# Patient Record
Sex: FEMALE | Race: WHITE | HISPANIC OR LATINO | URBAN - METROPOLITAN AREA
[De-identification: names, ages, dates, MRNs, and addresses within clinical notes are randomized per-mention and may not be internally consistent; named-entity substitution may affect disease eponyms.]

---

## 2019-09-26 ENCOUNTER — OUTPATIENT (OUTPATIENT)
Dept: OUTPATIENT SERVICES | Facility: HOSPITAL | Age: 49
LOS: 1 days | Discharge: HOME | End: 2019-09-26

## 2019-09-26 DIAGNOSIS — F11.20 OPIOID DEPENDENCE, UNCOMPLICATED: ICD-10-CM

## 2019-09-26 DIAGNOSIS — E13.9 OTHER SPECIFIED DIABETES MELLITUS WITHOUT COMPLICATIONS: ICD-10-CM

## 2019-09-26 DIAGNOSIS — D51.9 VITAMIN B12 DEFICIENCY ANEMIA, UNSPECIFIED: ICD-10-CM

## 2019-09-26 DIAGNOSIS — R94.6 ABNORMAL RESULTS OF THYROID FUNCTION STUDIES: ICD-10-CM

## 2019-09-26 DIAGNOSIS — F41.9 ANXIETY DISORDER, UNSPECIFIED: ICD-10-CM

## 2019-09-26 DIAGNOSIS — R53.82 CHRONIC FATIGUE, UNSPECIFIED: ICD-10-CM

## 2019-09-26 DIAGNOSIS — E78.5 HYPERLIPIDEMIA, UNSPECIFIED: ICD-10-CM

## 2019-09-26 DIAGNOSIS — Z23 ENCOUNTER FOR IMMUNIZATION: ICD-10-CM

## 2020-05-24 ENCOUNTER — TRANSCRIPTION ENCOUNTER (OUTPATIENT)
Age: 50
End: 2020-05-24

## 2021-02-16 PROBLEM — Z00.00 ENCOUNTER FOR PREVENTIVE HEALTH EXAMINATION: Status: ACTIVE | Noted: 2021-02-16

## 2021-04-19 ENCOUNTER — APPOINTMENT (OUTPATIENT)
Dept: UROGYNECOLOGY | Facility: CLINIC | Age: 51
End: 2021-04-19

## 2021-05-28 ENCOUNTER — APPOINTMENT (OUTPATIENT)
Dept: SURGERY | Facility: CLINIC | Age: 51
End: 2021-05-28
Payer: COMMERCIAL

## 2021-05-28 VITALS
DIASTOLIC BLOOD PRESSURE: 74 MMHG | HEIGHT: 61 IN | SYSTOLIC BLOOD PRESSURE: 126 MMHG | WEIGHT: 222 LBS | TEMPERATURE: 97.9 F | BODY MASS INDEX: 41.91 KG/M2

## 2021-05-28 DIAGNOSIS — Z78.9 OTHER SPECIFIED HEALTH STATUS: ICD-10-CM

## 2021-05-28 DIAGNOSIS — F17.200 NICOTINE DEPENDENCE, UNSPECIFIED, UNCOMPLICATED: ICD-10-CM

## 2021-05-28 DIAGNOSIS — Z82.3 FAMILY HISTORY OF STROKE: ICD-10-CM

## 2021-05-28 DIAGNOSIS — Z82.49 FAMILY HISTORY OF ISCHEMIC HEART DISEASE AND OTHER DISEASES OF THE CIRCULATORY SYSTEM: ICD-10-CM

## 2021-05-28 PROCEDURE — 99244 OFF/OP CNSLTJ NEW/EST MOD 40: CPT

## 2021-05-28 PROCEDURE — 99072 ADDL SUPL MATRL&STAF TM PHE: CPT

## 2021-06-02 NOTE — HISTORY OF PRESENT ILLNESS
[de-identified] : 49 year old female with a BMI of 41 presents today for Bariatric Consultation in consideration for the Laparoscopic Sleeve Gastrectomy. She has tried multiple weight loss modalities in the past such as portion control, weight watchers and Keto diet without any long term success. Patient states that she is concerned about her inability to lose weight on her own as she gets older. She has been obese for several years and is seeking surgery for improvement of hers overall health and and to mitigate the impact of future medical comorbidities. She has been a smoker for over 30 years and we discussed the risks of smoking and she is aware that she must quit for 2 months before we can proceed with surgery. She is currently walking 3-4 days/week for 20 minutes \par \par \par

## 2021-06-02 NOTE — CONSULT LETTER
[Courtesy Letter:] : I had the pleasure of seeing your patient, [unfilled], in my office today. [Please see my note below.] : Please see my note below. [Consult Closing:] : Thank you very much for allowing me to participate in the care of this patient.  If you have any questions, please do not hesitate to contact me. [Sincerely,] : Sincerely, [FreeTextEntry3] : Kari Fallon MD, FACS, FASMBS, Diplo ABOM\par Bariatric, Foregut & Minimally Invasive Surgery\par Associate Medical Director, Quality\par [facility] \par [facility]\par

## 2021-06-02 NOTE — PLAN
[FreeTextEntry1] : Plan: Information seminar.\par          Nutrition evaluation.\par          Preoperative evaluations.\par          Call with concerns.\par

## 2021-06-02 NOTE — REASON FOR VISIT
[Initial Consult] : an initial consult for [Morbid Obesity (BMI>40)] : morbid obesity (bmi>40) [FreeTextEntry2] : Patient presents for Bariatric Consultation

## 2021-06-02 NOTE — ASSESSMENT
[FreeTextEntry1] : JASON DONOHUE is a 50 year female seen today for Bariatric Consultation. The surgical options for weight loss have been extensively discussed with the patient and questions answered. The patient was provided written and verbal education regarding the Sleeve Gastrectomy. The patient appears to have a reasonable understanding of the process and is ready to proceed.  Risks, including but not limited to:  anesthesia, death, bleeding, infection, leaks, blood clots, ulcers, malnutrition and need for additional operations have been reviewed.  The importance of a consistent diet and exercise regimen in regards to weight loss and maintenance has also been discussed and the patient agrees to actively participate in the process.  The importance of lifelong mineral and vitamin supplementation was also reviewed with the patient. The need to adhere to an appropriate diet and exercise regimen were emphasized; in particular the need to perform moderate to intense physical activity most days of the week.  No promises have been made in regards to any given outcome and possibility of inadequate weight loss as well as regain has been discussed with the patient.  The patient understands that a long-term commitment is necessary for long-term success.\par

## 2021-06-02 NOTE — PHYSICAL EXAM
[Normal] : affect appropriate [de-identified] : Mallampati II [de-identified] : Soft, nondistended.

## 2021-06-02 NOTE — ADDENDUM
[FreeTextEntry1] : Patient seen, examined and reviewed with practitioner.  I agree with the assessment and plan; note amended as appropriate.  Patient is an appropriate candidate for WLS; will start preoperativeeducation and assessment process.\par

## 2021-06-23 ENCOUNTER — NON-APPOINTMENT (OUTPATIENT)
Age: 51
End: 2021-06-23

## 2021-07-01 ENCOUNTER — APPOINTMENT (OUTPATIENT)
Dept: UROGYNECOLOGY | Facility: CLINIC | Age: 51
End: 2021-07-01

## 2021-07-12 ENCOUNTER — NON-APPOINTMENT (OUTPATIENT)
Age: 51
End: 2021-07-12

## 2021-07-19 ENCOUNTER — APPOINTMENT (OUTPATIENT)
Dept: SURGERY | Facility: CLINIC | Age: 51
End: 2021-07-19
Payer: COMMERCIAL

## 2021-07-19 VITALS — HEIGHT: 61 IN | BODY MASS INDEX: 40.78 KG/M2 | WEIGHT: 216 LBS

## 2021-07-19 PROCEDURE — ZZZZZ: CPT

## 2021-07-20 ENCOUNTER — APPOINTMENT (OUTPATIENT)
Dept: CARDIOLOGY | Facility: CLINIC | Age: 51
End: 2021-07-20
Payer: COMMERCIAL

## 2021-07-20 VITALS — DIASTOLIC BLOOD PRESSURE: 80 MMHG | SYSTOLIC BLOOD PRESSURE: 120 MMHG | HEART RATE: 76 BPM | RESPIRATION RATE: 18 BRPM

## 2021-07-20 VITALS — HEIGHT: 61 IN | WEIGHT: 221 LBS | BODY MASS INDEX: 41.72 KG/M2

## 2021-07-20 DIAGNOSIS — Z78.9 OTHER SPECIFIED HEALTH STATUS: ICD-10-CM

## 2021-07-20 PROCEDURE — 99072 ADDL SUPL MATRL&STAF TM PHE: CPT

## 2021-07-20 PROCEDURE — 93000 ELECTROCARDIOGRAM COMPLETE: CPT | Mod: NC

## 2021-07-20 PROCEDURE — 99204 OFFICE O/P NEW MOD 45 MIN: CPT

## 2021-07-21 ENCOUNTER — TRANSCRIPTION ENCOUNTER (OUTPATIENT)
Age: 51
End: 2021-07-21

## 2021-07-27 ENCOUNTER — APPOINTMENT (OUTPATIENT)
Age: 51
End: 2021-07-27
Payer: COMMERCIAL

## 2021-07-27 VITALS
WEIGHT: 217 LBS | BODY MASS INDEX: 40.97 KG/M2 | SYSTOLIC BLOOD PRESSURE: 120 MMHG | RESPIRATION RATE: 12 BRPM | HEART RATE: 84 BPM | DIASTOLIC BLOOD PRESSURE: 80 MMHG | OXYGEN SATURATION: 95 % | HEIGHT: 61 IN

## 2021-07-27 PROCEDURE — 71046 X-RAY EXAM CHEST 2 VIEWS: CPT

## 2021-07-27 PROCEDURE — 94010 BREATHING CAPACITY TEST: CPT

## 2021-07-27 PROCEDURE — 99072 ADDL SUPL MATRL&STAF TM PHE: CPT

## 2021-07-27 PROCEDURE — 99203 OFFICE O/P NEW LOW 30 MIN: CPT | Mod: 25

## 2021-07-27 NOTE — HISTORY OF PRESENT ILLNESS
[Unrefreshing Sleep] : unrefreshing sleep [Sleepy When Sedentary] : sleepy when sedentary [Irritability] : irritability [Dry Throat] : dry throat [None] : The patient is not currently being treated for this problem [Obesity] : obesity

## 2021-07-27 NOTE — PHYSICAL EXAM
[No Acute Distress] : no acute distress [IV] : Mallampati Class: IV [Normal Appearance] : normal appearance [No Neck Mass] : no neck mass [Normal Rate/Rhythm] : normal rate/rhythm [Normal S1, S2] : normal s1, s2 [No Murmurs] : no murmurs [No Resp Distress] : no resp distress [Clear to Auscultation Bilaterally] : clear to auscultation bilaterally [No Abnormalities] : no abnormalities [Normal Gait] : normal gait [No Clubbing] : no clubbing [No Cyanosis] : no cyanosis [No Edema] : no edema [FROM] : FROM [No Focal Deficits] : no focal deficits [Oriented x3] : oriented x3 [Normal Affect] : normal affect

## 2021-07-27 NOTE — REASON FOR VISIT
[Initial] : an initial visit [Sleep Apnea] : sleep apnea [Pre-op Risk Stratification] : pre-op risk stratification

## 2021-07-29 ENCOUNTER — APPOINTMENT (OUTPATIENT)
Dept: CARDIOLOGY | Facility: CLINIC | Age: 51
End: 2021-07-29
Payer: COMMERCIAL

## 2021-07-29 PROCEDURE — 99072 ADDL SUPL MATRL&STAF TM PHE: CPT

## 2021-07-29 PROCEDURE — 93015 CV STRESS TEST SUPVJ I&R: CPT

## 2021-07-29 PROCEDURE — 93306 TTE W/DOPPLER COMPLETE: CPT

## 2021-08-09 ENCOUNTER — RESULT REVIEW (OUTPATIENT)
Age: 51
End: 2021-08-09

## 2021-08-09 ENCOUNTER — OUTPATIENT (OUTPATIENT)
Dept: OUTPATIENT SERVICES | Facility: HOSPITAL | Age: 51
LOS: 1 days | Discharge: HOME | End: 2021-08-09
Payer: COMMERCIAL

## 2021-08-09 DIAGNOSIS — R91.1 SOLITARY PULMONARY NODULE: ICD-10-CM

## 2021-08-09 PROCEDURE — 71250 CT THORAX DX C-: CPT | Mod: 26

## 2021-08-10 ENCOUNTER — APPOINTMENT (OUTPATIENT)
Dept: SURGERY | Facility: CLINIC | Age: 51
End: 2021-08-10
Payer: COMMERCIAL

## 2021-08-10 VITALS — WEIGHT: 215 LBS | HEIGHT: 61 IN | BODY MASS INDEX: 40.59 KG/M2

## 2021-08-10 PROCEDURE — ZZZZZ: CPT

## 2021-09-02 ENCOUNTER — APPOINTMENT (OUTPATIENT)
Dept: SURGERY | Facility: CLINIC | Age: 51
End: 2021-09-02
Payer: COMMERCIAL

## 2021-09-02 VITALS — BODY MASS INDEX: 40.78 KG/M2 | WEIGHT: 216 LBS | HEIGHT: 61 IN

## 2021-09-02 PROCEDURE — ZZZZZ: CPT

## 2021-09-22 ENCOUNTER — OUTPATIENT (OUTPATIENT)
Dept: OUTPATIENT SERVICES | Facility: HOSPITAL | Age: 51
LOS: 1 days | Discharge: HOME | End: 2021-09-22
Payer: COMMERCIAL

## 2021-09-22 ENCOUNTER — APPOINTMENT (OUTPATIENT)
Dept: UROGYNECOLOGY | Facility: CLINIC | Age: 51
End: 2021-09-22
Payer: COMMERCIAL

## 2021-09-22 VITALS
HEIGHT: 61 IN | BODY MASS INDEX: 40.78 KG/M2 | SYSTOLIC BLOOD PRESSURE: 139 MMHG | WEIGHT: 216 LBS | DIASTOLIC BLOOD PRESSURE: 84 MMHG | HEART RATE: 77 BPM

## 2021-09-22 DIAGNOSIS — Z86.59 PERSONAL HISTORY OF OTHER MENTAL AND BEHAVIORAL DISORDERS: ICD-10-CM

## 2021-09-22 DIAGNOSIS — Z01.810 ENCOUNTER FOR PREPROCEDURAL CARDIOVASCULAR EXAMINATION: ICD-10-CM

## 2021-09-22 PROCEDURE — 99243 OFF/OP CNSLTJ NEW/EST LOW 30: CPT | Mod: 25

## 2021-09-22 PROCEDURE — 51701 INSERT BLADDER CATHETER: CPT

## 2021-09-22 PROCEDURE — 95806 SLEEP STUDY UNATT&RESP EFFT: CPT | Mod: 26

## 2021-09-22 RX ORDER — AMITRIPTYLINE HYDROCHLORIDE 10 MG/1
10 TABLET, FILM COATED ORAL
Refills: 0 | Status: DISCONTINUED | COMMUNITY
End: 2021-09-22

## 2021-09-22 NOTE — COUNSELING
[FreeTextEntry1] : Place a pea size (0.5 grams or less) dab of Estrogen vaginal cream using finger (NOT the applicator) into the vagina 3 times a week ( Monday, Wednesday, Friday)\par \par Please return in 2 months or as needed.

## 2021-09-22 NOTE — ASSESSMENT
[FreeTextEntry1] : Vaginal atrophy -\par Discussed etiology and treatment options with patient. Discussed R/B/A of estrogen vaginal cream. Patient will start using as follows:\par Place a pea size (0.5 grams or less) dab of Estrogen vaginal cream using finger (NOT the applicator) into the vagina 3 times a week ( Monday, Wednesday, Friday)\par I also advised for her to stop using boric acid inside the vagina at this time.\par She would like to use Azo PRN.\par She will return in about 2 months.\par \par Stress urinary incontinence -\par At this point patient is not very much bothered by her stress urinary incontinence, and her main concern is the vaginal burning. We will reevaluate her SYLVIA symptoms when she returns for a follow up visit.

## 2021-09-22 NOTE — PHYSICAL EXAM
[Chaperone Present] : A chaperone was present in the examining room during all aspects of the physical examination [FreeTextEntry1] : Void: 150cc\par \par PVR: 10cc\par \par Urethra was prepped in sterile fashion and then a sterile catheter was used by me to drain the bladder.\par \par several inclusion cysts about 2mm diameter each on labia major bilaterally\par \par + empty cough stress test\par \par + atrophy\par \par no urethral caruncle\par \par no vestibular tenderness\par \par + urethral hypermobility\par \par no pelvic floor dysfunction\par \par no urethral tenderness\par \par no bladder tenderness\par \par no cervical tenderness\par \par no uterine tenderness\par \par good sphincter tone\par \par good rectal squeeze\par \par 3mm perianal skin tag at 7 o'clock\par \par intact sacral nerves\par \par 1/5 Kegel\par

## 2021-09-22 NOTE — HISTORY OF PRESENT ILLNESS
[FreeTextEntry1] : 50 year para 2 (NSVDx2)  presents with complaints of vaginal burning for several years, worsening over the last 2 years. Symptoms are worsened by urinary urgency, relieved by azo . Recently finished course of Macrobid two weeks ago for suspected UTI (not culture-proven), did not relieve symptoms. Was also prescribed boric acid on 9/16, has not yet experienced relief of symptoms. \par \par Pelvic organ prolapse: no bulge, no pressure/heaviness\par \par Stress urinary incontinence: 2x/week no prior incontinence procedures\par \par Overactive bladder syndrome: daily frequency 5-6x/day, 1-2x/night,  + urgency,  0x/week UUI episodes,    \par 4-5pads/day     Bladder irritants include coffee, soda, seltzer.    Prior OAB meds: no\par \par Voiding dysfunction: denies Incomplete bladder emptying, denies hesitancy\par \par Lower urinary tract/vaginal symptoms: 1-2 UTIs per year, no hematuria, no dysuria, no bladder pain\par \par 7 BM/week   no constipation   Fecal incontinence no\par \par Sexually active: yes  Dyspareunia: no   Pelvic pain: no Vaginal dryness: yes   LMP: 3 months ago   \par

## 2021-09-23 DIAGNOSIS — G47.33 OBSTRUCTIVE SLEEP APNEA (ADULT) (PEDIATRIC): ICD-10-CM

## 2021-09-27 LAB — BACTERIA UR CULT: NORMAL

## 2021-09-29 ENCOUNTER — APPOINTMENT (OUTPATIENT)
Age: 51
End: 2021-09-29
Payer: COMMERCIAL

## 2021-09-29 VITALS
SYSTOLIC BLOOD PRESSURE: 112 MMHG | HEART RATE: 102 BPM | RESPIRATION RATE: 14 BRPM | WEIGHT: 224 LBS | HEIGHT: 61 IN | DIASTOLIC BLOOD PRESSURE: 70 MMHG | OXYGEN SATURATION: 94 % | BODY MASS INDEX: 42.29 KG/M2

## 2021-09-29 PROCEDURE — 99213 OFFICE O/P EST LOW 20 MIN: CPT

## 2021-10-12 ENCOUNTER — APPOINTMENT (OUTPATIENT)
Dept: CARDIOLOGY | Facility: CLINIC | Age: 51
End: 2021-10-12
Payer: COMMERCIAL

## 2021-10-12 VITALS — RESPIRATION RATE: 18 BRPM | DIASTOLIC BLOOD PRESSURE: 80 MMHG | HEART RATE: 76 BPM | SYSTOLIC BLOOD PRESSURE: 110 MMHG

## 2021-10-12 DIAGNOSIS — Z01.810 ENCOUNTER FOR PREPROCEDURAL CARDIOVASCULAR EXAMINATION: ICD-10-CM

## 2021-10-12 PROCEDURE — 93000 ELECTROCARDIOGRAM COMPLETE: CPT | Mod: NC

## 2021-10-12 PROCEDURE — 99213 OFFICE O/P EST LOW 20 MIN: CPT

## 2021-10-20 ENCOUNTER — APPOINTMENT (OUTPATIENT)
Age: 51
End: 2021-10-20
Payer: COMMERCIAL

## 2021-10-20 PROCEDURE — 99441: CPT | Mod: 95

## 2021-10-28 ENCOUNTER — NON-APPOINTMENT (OUTPATIENT)
Age: 51
End: 2021-10-28

## 2021-11-15 ENCOUNTER — OUTPATIENT (OUTPATIENT)
Dept: OUTPATIENT SERVICES | Facility: HOSPITAL | Age: 51
LOS: 1 days | Discharge: HOME | End: 2021-11-15

## 2021-11-15 ENCOUNTER — APPOINTMENT (OUTPATIENT)
Dept: NEUROPSYCHOLOGY | Facility: CLINIC | Age: 51
End: 2021-11-15

## 2021-11-15 DIAGNOSIS — E66.01 MORBID (SEVERE) OBESITY DUE TO EXCESS CALORIES: ICD-10-CM

## 2021-11-19 ENCOUNTER — APPOINTMENT (OUTPATIENT)
Dept: SURGERY | Facility: CLINIC | Age: 51
End: 2021-11-19
Payer: COMMERCIAL

## 2021-11-19 VITALS
DIASTOLIC BLOOD PRESSURE: 90 MMHG | HEART RATE: 86 BPM | TEMPERATURE: 97.5 F | BODY MASS INDEX: 41.91 KG/M2 | SYSTOLIC BLOOD PRESSURE: 110 MMHG | HEIGHT: 61 IN | WEIGHT: 222 LBS | OXYGEN SATURATION: 95 %

## 2021-11-19 DIAGNOSIS — E66.01 MORBID (SEVERE) OBESITY DUE TO EXCESS CALORIES: ICD-10-CM

## 2021-11-19 PROCEDURE — 99213 OFFICE O/P EST LOW 20 MIN: CPT

## 2021-11-19 RX ORDER — IBUPROFEN 800 MG
800 TABLET ORAL AS DIRECTED
Refills: 0 | Status: DISCONTINUED | COMMUNITY
End: 2021-11-19

## 2021-11-19 NOTE — PLAN
[FreeTextEntry1] : We discussed how she needs to quit smoking prior to obtaining a surgery date\par F/u with her PMD and pulmonologist to discuss possibly starting Chantix\par

## 2021-11-19 NOTE — ASSESSMENT
[FreeTextEntry1] : 49 y/o female with Class 3 obesity preparing for laparoscopic sleeve gastrectomy.

## 2021-11-19 NOTE — HISTORY OF PRESENT ILLNESS
[de-identified] : Marilia is preparing for laparoscopic sleeve gastrectomy.  She still needs her pulmonary clearance because the CPAP machine has been on backorder.  She is still smoking cigarettes, although she has cut down.

## 2021-11-23 ENCOUNTER — APPOINTMENT (OUTPATIENT)
Dept: UROGYNECOLOGY | Facility: CLINIC | Age: 51
End: 2021-11-23
Payer: COMMERCIAL

## 2021-11-23 VITALS
HEIGHT: 61 IN | DIASTOLIC BLOOD PRESSURE: 95 MMHG | HEART RATE: 84 BPM | SYSTOLIC BLOOD PRESSURE: 146 MMHG | WEIGHT: 216 LBS | BODY MASS INDEX: 40.78 KG/M2

## 2021-11-23 PROCEDURE — 99215 OFFICE O/P EST HI 40 MIN: CPT

## 2021-11-23 NOTE — COUNSELING
[FreeTextEntry1] : Please return in about a month.\par \par \par Please continue to use vaginal estrogen cream as instructed:\par Place a pea size (0.5 grams or less) dab of Estrogen vaginal cream using finger (NOT the applicator) into the vagina 3 times a week ( Monday, Wednesday, Friday)\par \par \par For Urgency, Frequency and Urge related incontinence.\par \par Please decrease or stop the use of the following:\par \par 1. Coffee (Caffeinated and decaffeinated)\par \par 2. Teas (Caffeinated, decaffeinated, Ice tea, and green teas\par \par 3. All sodas (Caffeinated, decaffeinated, energy drinks)\par \par 4. All carbonated drinks including seltzer water\par \par 5. All citric fruit juices\par \par 6. Water with lemon or lime\par \par 7. Spicy foods\par \par 8. Tomato Sauce based foods\par \par 9. Chocolate and chocolate containing products\par \par 10. All alcohol\par

## 2021-11-23 NOTE — HISTORY OF PRESENT ILLNESS
[FreeTextEntry1] : 50 year with vaginal atrophy, SYLVIA, nocturia. \par She continues to have burning despite using vaginal estrogen cream.\par  \par Voids 4-7/ day, 2 /night. + urgency, 2-3 SYLVIA/week, 0 UUI/week, no UTI's in past 6 months,    does not follow bladder diet.\par 7 BM/week. no FI, no fiber, no Miralax \par Uses vaginal estrogen 4x/week. no vaginal bleeding, no vaginal/pelvic pain. last time sexually active 3 months ago due to burning.\par  \par Of note patient is preparing for a sleeve gastrectomy.\par \par \par \par From initial visit:\par 50 year para 2 (NSVDx2)  presents with complaints of vaginal burning for several years, worsening over the last 2 years. Symptoms are worsened by urinary urgency, relieved by azo . Recently finished course of Macrobid two weeks ago for suspected UTI (not culture-proven), did not relieve symptoms. Was also prescribed boric acid on 9/16, has not yet experienced relief of symptoms. \par \par Pelvic organ prolapse: no bulge, no pressure/heaviness\par \par Stress urinary incontinence: 2x/week no prior incontinence procedures\par \par Overactive bladder syndrome: daily frequency 5-6x/day, 1-2x/night,  + urgency,  0x/week UUI episodes,    \par 4-5 pads/day     Bladder irritants include coffee, soda, seltzer.    Prior OAB meds: no\par \par Voiding dysfunction: denies Incomplete bladder emptying, denies hesitancy\par \par Lower urinary tract/vaginal symptoms: 1-2 UTIs per year, no hematuria, no dysuria, no bladder pain\par \par 7 BM/week   no constipation   Fecal incontinence no\par \par Sexually active: yes  Dyspareunia: no   Pelvic pain: no Vaginal dryness: yes   LMP: 3 months ago   \par

## 2021-11-23 NOTE — PHYSICAL EXAM
[Chaperone Present] : A chaperone was present in the examining room during all aspects of the physical examination [FreeTextEntry1] : \par several inclusion cysts about 2mm diameter each on labia major bilaterally, unchanged\par \par + improved atrophy\par \par no urethral caruncle\par \par no vestibular tenderness\par \par + urethral hypermobility\par \par no pelvic floor dysfunction\par \par no urethral tenderness\par \par no bladder tenderness\par \par no cervical tenderness\par \par no uterine tenderness\par \par good sphincter tone\par \par good rectal squeeze\par \par 3mm perianal skin tag at 7 o'clock\par \par intact sacral nerves\par \par 1/5 Kegel\par

## 2021-11-23 NOTE — ASSESSMENT
[FreeTextEntry1] : Bladder pain -\par Patient continues to have burning symptoms despite using vaginal estrogen cream as instructed. It is hard to characterize her symptoms, however she believes that eating spicy foods would make her symptoms worse.  I discussed with her the bladder diet. She has many irritants in her diet currently and I asked her to stop consuming these irritants for the next 3-4 weeks. In addition, she will start using Uribel. She will return in about a month to follow up with me.\par \par Vaginal atrophy -\par Improved. WIll continue with vaginal estrogen cream as before.\par \par Stress urinary incontinence -\par Continues to have these symptoms, however this continues to not be very bothersome to the patient. Her symptoms are also likely to get better after her bariatric surgery.

## 2021-11-24 ENCOUNTER — NON-APPOINTMENT (OUTPATIENT)
Age: 51
End: 2021-11-24

## 2021-11-30 ENCOUNTER — APPOINTMENT (OUTPATIENT)
Age: 51
End: 2021-11-30
Payer: COMMERCIAL

## 2021-11-30 VITALS
HEIGHT: 61 IN | WEIGHT: 222 LBS | OXYGEN SATURATION: 95 % | SYSTOLIC BLOOD PRESSURE: 122 MMHG | RESPIRATION RATE: 16 BRPM | BODY MASS INDEX: 41.91 KG/M2 | HEART RATE: 94 BPM | DIASTOLIC BLOOD PRESSURE: 80 MMHG

## 2021-11-30 PROCEDURE — 99213 OFFICE O/P EST LOW 20 MIN: CPT

## 2021-12-14 ENCOUNTER — INPATIENT (INPATIENT)
Facility: HOSPITAL | Age: 51
LOS: 2 days | Discharge: HOME | End: 2021-12-17
Attending: INTERNAL MEDICINE | Admitting: INTERNAL MEDICINE
Payer: COMMERCIAL

## 2021-12-14 VITALS
OXYGEN SATURATION: 98 % | SYSTOLIC BLOOD PRESSURE: 164 MMHG | TEMPERATURE: 97 F | DIASTOLIC BLOOD PRESSURE: 80 MMHG | RESPIRATION RATE: 18 BRPM | HEART RATE: 82 BPM

## 2021-12-14 LAB
ALBUMIN SERPL ELPH-MCNC: 4.5 G/DL — SIGNIFICANT CHANGE UP (ref 3.5–5.2)
ALP SERPL-CCNC: 99 U/L — SIGNIFICANT CHANGE UP (ref 30–115)
ALT FLD-CCNC: 18 U/L — SIGNIFICANT CHANGE UP (ref 0–41)
ANION GAP SERPL CALC-SCNC: 14 MMOL/L — SIGNIFICANT CHANGE UP (ref 7–14)
APPEARANCE UR: CLEAR — SIGNIFICANT CHANGE UP
AST SERPL-CCNC: 15 U/L — SIGNIFICANT CHANGE UP (ref 0–41)
BACTERIA # UR AUTO: NEGATIVE — SIGNIFICANT CHANGE UP
BASOPHILS # BLD AUTO: 0.03 K/UL — SIGNIFICANT CHANGE UP (ref 0–0.2)
BASOPHILS NFR BLD AUTO: 0.3 % — SIGNIFICANT CHANGE UP (ref 0–1)
BILIRUB SERPL-MCNC: 0.4 MG/DL — SIGNIFICANT CHANGE UP (ref 0.2–1.2)
BILIRUB UR-MCNC: NEGATIVE — SIGNIFICANT CHANGE UP
BUN SERPL-MCNC: 16 MG/DL — SIGNIFICANT CHANGE UP (ref 10–20)
CALCIUM SERPL-MCNC: 9.3 MG/DL — SIGNIFICANT CHANGE UP (ref 8.5–10.1)
CHLORIDE SERPL-SCNC: 108 MMOL/L — SIGNIFICANT CHANGE UP (ref 98–110)
CO2 SERPL-SCNC: 24 MMOL/L — SIGNIFICANT CHANGE UP (ref 17–32)
COLOR SPEC: ABNORMAL
CREAT SERPL-MCNC: 0.7 MG/DL — SIGNIFICANT CHANGE UP (ref 0.7–1.5)
DIFF PNL FLD: ABNORMAL
EOSINOPHIL # BLD AUTO: 0.09 K/UL — SIGNIFICANT CHANGE UP (ref 0–0.7)
EOSINOPHIL NFR BLD AUTO: 1 % — SIGNIFICANT CHANGE UP (ref 0–8)
EPI CELLS # UR: 4 /HPF — SIGNIFICANT CHANGE UP (ref 0–5)
GLUCOSE BLDC GLUCOMTR-MCNC: 124 MG/DL — HIGH (ref 70–99)
GLUCOSE SERPL-MCNC: 133 MG/DL — HIGH (ref 70–99)
GLUCOSE UR QL: NEGATIVE — SIGNIFICANT CHANGE UP
HCT VFR BLD CALC: 43.4 % — SIGNIFICANT CHANGE UP (ref 37–47)
HGB BLD-MCNC: 13.6 G/DL — SIGNIFICANT CHANGE UP (ref 12–16)
HYALINE CASTS # UR AUTO: 0 /LPF — SIGNIFICANT CHANGE UP (ref 0–7)
IMM GRANULOCYTES NFR BLD AUTO: 0.3 % — SIGNIFICANT CHANGE UP (ref 0.1–0.3)
KETONES UR-MCNC: NEGATIVE — SIGNIFICANT CHANGE UP
LEUKOCYTE ESTERASE UR-ACNC: NEGATIVE — SIGNIFICANT CHANGE UP
LIDOCAIN IGE QN: 146 U/L — HIGH (ref 7–60)
LYMPHOCYTES # BLD AUTO: 2.19 K/UL — SIGNIFICANT CHANGE UP (ref 1.2–3.4)
LYMPHOCYTES # BLD AUTO: 24.7 % — SIGNIFICANT CHANGE UP (ref 20.5–51.1)
MCHC RBC-ENTMCNC: 30.1 PG — SIGNIFICANT CHANGE UP (ref 27–31)
MCHC RBC-ENTMCNC: 31.3 G/DL — LOW (ref 32–37)
MCV RBC AUTO: 96 FL — SIGNIFICANT CHANGE UP (ref 81–99)
MONOCYTES # BLD AUTO: 0.45 K/UL — SIGNIFICANT CHANGE UP (ref 0.1–0.6)
MONOCYTES NFR BLD AUTO: 5.1 % — SIGNIFICANT CHANGE UP (ref 1.7–9.3)
NEUTROPHILS # BLD AUTO: 6.07 K/UL — SIGNIFICANT CHANGE UP (ref 1.4–6.5)
NEUTROPHILS NFR BLD AUTO: 68.6 % — SIGNIFICANT CHANGE UP (ref 42.2–75.2)
NITRITE UR-MCNC: NEGATIVE — SIGNIFICANT CHANGE UP
NRBC # BLD: 0 /100 WBCS — SIGNIFICANT CHANGE UP (ref 0–0)
PH UR: 7 — SIGNIFICANT CHANGE UP (ref 5–8)
PLATELET # BLD AUTO: 205 K/UL — SIGNIFICANT CHANGE UP (ref 130–400)
POTASSIUM SERPL-MCNC: 4.3 MMOL/L — SIGNIFICANT CHANGE UP (ref 3.5–5)
POTASSIUM SERPL-SCNC: 4.3 MMOL/L — SIGNIFICANT CHANGE UP (ref 3.5–5)
PROT SERPL-MCNC: 7 G/DL — SIGNIFICANT CHANGE UP (ref 6–8)
PROT UR-MCNC: SIGNIFICANT CHANGE UP
RAPID RVP RESULT: SIGNIFICANT CHANGE UP
RBC # BLD: 4.52 M/UL — SIGNIFICANT CHANGE UP (ref 4.2–5.4)
RBC # FLD: 13.4 % — SIGNIFICANT CHANGE UP (ref 11.5–14.5)
RBC CASTS # UR COMP ASSIST: 15 /HPF — HIGH (ref 0–4)
SARS-COV-2 RNA SPEC QL NAA+PROBE: SIGNIFICANT CHANGE UP
SODIUM SERPL-SCNC: 146 MMOL/L — SIGNIFICANT CHANGE UP (ref 135–146)
SP GR SPEC: >1.05 (ref 1.01–1.03)
UROBILINOGEN FLD QL: SIGNIFICANT CHANGE UP
WBC # BLD: 8.86 K/UL — SIGNIFICANT CHANGE UP (ref 4.8–10.8)
WBC # FLD AUTO: 8.86 K/UL — SIGNIFICANT CHANGE UP (ref 4.8–10.8)
WBC UR QL: 5 /HPF — SIGNIFICANT CHANGE UP (ref 0–5)

## 2021-12-14 PROCEDURE — 71045 X-RAY EXAM CHEST 1 VIEW: CPT | Mod: 26

## 2021-12-14 PROCEDURE — 99223 1ST HOSP IP/OBS HIGH 75: CPT

## 2021-12-14 PROCEDURE — 76705 ECHO EXAM OF ABDOMEN: CPT | Mod: 26

## 2021-12-14 PROCEDURE — 74177 CT ABD & PELVIS W/CONTRAST: CPT | Mod: 26,MA

## 2021-12-14 PROCEDURE — 99285 EMERGENCY DEPT VISIT HI MDM: CPT

## 2021-12-14 RX ORDER — CEFTRIAXONE 500 MG/1
1000 INJECTION, POWDER, FOR SOLUTION INTRAMUSCULAR; INTRAVENOUS EVERY 24 HOURS
Refills: 0 | Status: COMPLETED | OUTPATIENT
Start: 2021-12-14 | End: 2021-12-16

## 2021-12-14 RX ORDER — KETOROLAC TROMETHAMINE 30 MG/ML
15 SYRINGE (ML) INJECTION ONCE
Refills: 0 | Status: DISCONTINUED | OUTPATIENT
Start: 2021-12-14 | End: 2021-12-14

## 2021-12-14 RX ORDER — PHENAZOPYRIDINE HCL 100 MG
100 TABLET ORAL EVERY 8 HOURS
Refills: 0 | Status: DISCONTINUED | OUTPATIENT
Start: 2021-12-14 | End: 2021-12-17

## 2021-12-14 RX ORDER — SODIUM CHLORIDE 9 MG/ML
1000 INJECTION, SOLUTION INTRAVENOUS ONCE
Refills: 0 | Status: COMPLETED | OUTPATIENT
Start: 2021-12-14 | End: 2021-12-14

## 2021-12-14 RX ORDER — ONDANSETRON 8 MG/1
4 TABLET, FILM COATED ORAL EVERY 8 HOURS
Refills: 0 | Status: DISCONTINUED | OUTPATIENT
Start: 2021-12-14 | End: 2021-12-17

## 2021-12-14 RX ORDER — CHLORHEXIDINE GLUCONATE 213 G/1000ML
1 SOLUTION TOPICAL
Refills: 0 | Status: DISCONTINUED | OUTPATIENT
Start: 2021-12-14 | End: 2021-12-17

## 2021-12-14 RX ORDER — TRAMADOL HYDROCHLORIDE 50 MG/1
50 TABLET ORAL THREE TIMES A DAY
Refills: 0 | Status: DISCONTINUED | OUTPATIENT
Start: 2021-12-14 | End: 2021-12-17

## 2021-12-14 RX ORDER — KETOROLAC TROMETHAMINE 30 MG/ML
15 SYRINGE (ML) INJECTION THREE TIMES A DAY
Refills: 0 | Status: DISCONTINUED | OUTPATIENT
Start: 2021-12-14 | End: 2021-12-17

## 2021-12-14 RX ORDER — SODIUM CHLORIDE 9 MG/ML
1000 INJECTION, SOLUTION INTRAVENOUS
Refills: 0 | Status: DISCONTINUED | OUTPATIENT
Start: 2021-12-14 | End: 2021-12-17

## 2021-12-14 RX ORDER — SODIUM CHLORIDE 9 MG/ML
1000 INJECTION, SOLUTION INTRAVENOUS
Refills: 0 | Status: DISCONTINUED | OUTPATIENT
Start: 2021-12-14 | End: 2021-12-14

## 2021-12-14 RX ORDER — MORPHINE SULFATE 50 MG/1
2 CAPSULE, EXTENDED RELEASE ORAL EVERY 6 HOURS
Refills: 0 | Status: DISCONTINUED | OUTPATIENT
Start: 2021-12-14 | End: 2021-12-14

## 2021-12-14 RX ORDER — PANTOPRAZOLE SODIUM 20 MG/1
40 TABLET, DELAYED RELEASE ORAL
Refills: 0 | Status: DISCONTINUED | OUTPATIENT
Start: 2021-12-14 | End: 2021-12-17

## 2021-12-14 RX ORDER — ENOXAPARIN SODIUM 100 MG/ML
40 INJECTION SUBCUTANEOUS DAILY
Refills: 0 | Status: DISCONTINUED | OUTPATIENT
Start: 2021-12-14 | End: 2021-12-17

## 2021-12-14 RX ORDER — ACETAMINOPHEN 500 MG
650 TABLET ORAL EVERY 6 HOURS
Refills: 0 | Status: DISCONTINUED | OUTPATIENT
Start: 2021-12-14 | End: 2021-12-17

## 2021-12-14 RX ORDER — CLONAZEPAM 1 MG
2 TABLET ORAL AT BEDTIME
Refills: 0 | Status: DISCONTINUED | OUTPATIENT
Start: 2021-12-14 | End: 2021-12-17

## 2021-12-14 RX ADMIN — Medication 2 MILLIGRAM(S): at 22:24

## 2021-12-14 RX ADMIN — CEFTRIAXONE 100 MILLIGRAM(S): 500 INJECTION, POWDER, FOR SOLUTION INTRAMUSCULAR; INTRAVENOUS at 22:20

## 2021-12-14 RX ADMIN — SODIUM CHLORIDE 1000 MILLILITER(S): 9 INJECTION, SOLUTION INTRAVENOUS at 15:11

## 2021-12-14 RX ADMIN — Medication 15 MILLIGRAM(S): at 16:00

## 2021-12-14 RX ADMIN — Medication 15 MILLIGRAM(S): at 13:30

## 2021-12-14 RX ADMIN — SODIUM CHLORIDE 150 MILLILITER(S): 9 INJECTION, SOLUTION INTRAVENOUS at 20:48

## 2021-12-14 RX ADMIN — Medication 650 MILLIGRAM(S): at 22:26

## 2021-12-14 RX ADMIN — Medication 15 MILLIGRAM(S): at 16:15

## 2021-12-14 RX ADMIN — Medication 15 MILLIGRAM(S): at 13:15

## 2021-12-14 RX ADMIN — SODIUM CHLORIDE 1000 MILLILITER(S): 9 INJECTION, SOLUTION INTRAVENOUS at 14:11

## 2021-12-14 RX ADMIN — Medication 650 MILLIGRAM(S): at 23:54

## 2021-12-14 RX ADMIN — Medication 100 MILLIGRAM(S): at 22:54

## 2021-12-14 NOTE — ED PROVIDER NOTE - ATTENDING CONTRIBUTION TO CARE
52 y/o female in ER with multiple complaints.  Pt states she hasn't' been feeling well for the past ~ 3 weeks.  Had URI symptoms - cough, congestion, was treated with zithromax but states symptoms have not improved.  Also states she has a long h/o urinary burning x yrs, and ~ 2 weeks ago she was started on a 14 course of macrobid (but states she did not have her urine checked prior to starting on course of abx?).  Pt c/o intermittent sharp lower abdominal pain for the past ~ 4 days.  + diarrhea - loose/watery/brown for the past 4 days.  no n/v.  + generalized weakness and decreased PO intake.  no cp/sob.  Had a fever 2 weeks ago, but that has resolved.  + intermittent HA's for the past month.  + sick contacts at home from the flu.  + states she received flu shot this year and has been vaccinated for covid.  PE- nad, nc/at, eomi, perrl, op - clear, mmm, neck supple, cta b/l, no w/r/r, rrr, abd - soft, + mild llower abdominal tenderness, no guarding/rebound, nabs, no cvat, from x 4, no LE swelling/tenderness, A&O x 3, cn 2-12 intact, no motor/sensory deficits.  -ivf, check labs, uz, cxr, nasal swab, ct abd.

## 2021-12-14 NOTE — ED PROVIDER NOTE - OBJECTIVE STATEMENT
51 y.o. F, no pmh here for eval of multiple medical complaints x 3 weeks. + sick contacts with flu. + cough, nasal congestion, + HA, + diarrhea. Denies fever chills cp sob, pleurisy. + dysuria was tx x3 days with abx. Is prone to UTI and has had multiple txs this year.

## 2021-12-14 NOTE — H&P ADULT - ASSESSMENT
Assessment and Plan  Case of a 51 year old female patient with history of recurrent UTI's who presented to the ED on 12/14 for evaluation of fatigue and lethargy in the setting of recent abdominal pain, diarrhea, and dysuria, found to have evidence of interstitial edematous pancreatitis on imaging, to be admitted for further investigations, management, and monitoring. Currently hemodynamically stable.      Interstitial Edematous Pancreatitis  Less Likely Acute Pancreatitis in Setting of Lack of Inflammatory Changes on Imaging and Lack of Typical Abdominal Pain and The Lipase Level  * Occasional Alcohol Use (Wine). Used to drink more heavily 15 years ago  * No TG level in system  * No US to comment on Roxanne/Choledocholithiasis  * No sepsis on presentation 12/14  * CT Abdomen and Pelvis w/ IV Cont (12.14.21 @ 15:15) Compared with the previous scan of 10/21/2015, the pancreas is  somewhat enlarged measuring 3.1 cm in maximum transverse diameter  (previously 2.6 cm. These findings are compatible with interstitial  edematous pancreatitis, in the appropriate clinical setting. There is no  evidence of peripancreatic inflammatory change or acute peripancreatic  fluid collections.  * ED WBC 8.86, Lipase 146  * S/P 1L LR bolus in ED  * S/P IV Ketorolac 15mg doses for pain control      - Monitor T: afebrile since admission  - Trend WBC no leukocytosis 8.86 12/14  - Follow up Gastroenterology Consult  - Keep patient NPO for now  - Start IV fluid hydration with LR at 150mL/hour. S/P 1L LR bolus in ED  - Monitor pain and keep score at 01/10: tylenol 650mg Q6h PRN and IV Morphine 2mg Q6h PRN  - Check Lipid profile with TG level  - Check RUQ US  - Will order US kidney and bladder to rule out nephrolithiasis since the pain is located at LLQ and RLQ and NOT epigastric  - Check blood culture 12/15   - Check LA  - Trend LFTs within normal. Lipase 12/14  - Monitor BM  - Alcohol Cessation      Watery Non Bloody Non Mucoid Diarrhea   Rule Out Clostridium Difficile in Setting of Recent 7 UTIs s/p multiple Courses of Antibiotics (Most Recent for the last 5 days)  Could Be Related to Edema Around Pancreas  * History of Recurrent UTIs. 7 Episodes during the last year. Most recent UTI 1 week ago s/p Macrobid 100mg BID (received 5 days/ 7days so far)  * Follows with a GYN specialist and PCP but no previous cultures per patient  * ED WBC 8.86, Lipase 146  * S/P 1L LR bolus in ED    - Monitor T: afebrile since admission  - Trend WBC no leukocytosis 8.86 12/14  - Monitor BM  - Please send Clostridium Difficile PCR  - Please send GI PCR  - Please send fecal calprotectin  - Please send stool culture (although low suspicion)   - Will start PO Vancomycin if PCR result comes back positive  - Will consider starting imodium PRN if diarrhea persists with no infectious etiology isolated      Recent Urinary Tract Infection  History of Recurrent UTIs  * History of Recurrent UTIs. 7 Episodes during the last year. Most recent UTI 1 week ago s/p Macrobid 100mg BID (received 5 days/ 7days so far)  * Follows with a GYN specialist and PCP but no previous cultures per patient  * ED WBC 8.86, Lipase 146  * Urine Microscopic-Add On (NC) (12.14.21 @ 16:49)    Epithelial Cells: 4 /HPF    Hyaline Casts: 0 /LPF    Bacteria: Negative    Red Blood Cell - Urine: 15 /HPF    White Blood Cell - Urine: 5 /HPF  * Urinalysis (12.14.21 @ 16:49)    pH Urine: 7.0    Glucose Qualitative, Urine: Negative    Blood, Urine: Small    Color: Delia    Urine Appearance: Clear    Bilirubin: Negative    Ketone - Urine: Negative    Specific Gravity: >1.050    Protein, Urine: Trace    Urobilinogen: <2 mg/dL    Nitrite: Negative    Leukocyte Esterase Concentration: Negative  * S/P 1L LR bolus in ED    - Monitor T: afebrile since admission  - Trend WBC no leukocytosis 8.86 12/14  - Follow up urine culture received 12/14  - Check blood culture 12/15  - Start IV Rocephin 1g QD for now since patient was on Macrobid 100mg BID (s/p 5days out of 7 days)       Others  - DVT Prophylaxis: Lovenox 40mg Subcutaneously daily  - GI Prophylaxis: Pantoprazole 40mg PO QD   - Diet: NPO for now  - Code Status: Full      Barriers to learning: NO  Discharge Planning: Patient will be discharged once stable  Plan was communicated with patient and medical team      Jose Dewitt MD  PGY -2 Internal Medicine   [facility]   Assessment and Plan  Case of a 51 year old female patient with history of recurrent UTI's who presented to the ED on 12/14 for evaluation of fatigue and lethargy in the setting of recent abdominal pain, diarrhea, and dysuria, found to have evidence of interstitial edematous pancreatitis on imaging, to be admitted for further investigations, management, and monitoring. Currently hemodynamically stable.      Interstitial Edematous Pancreatitis  Less Likely Acute Pancreatitis in Setting of Lack of Inflammatory Changes on Imaging and Lack of Typical Abdominal Pain and The Lipase Level  * Occasional Alcohol Use (Wine). Used to drink more heavily 15 years ago  * No TG level in system  * No US to comment on Roxanne/Choledocholithiasis  * No sepsis on presentation 12/14  * CT Abdomen and Pelvis w/ IV Cont (12.14.21 @ 15:15) Compared with the previous scan of 10/21/2015, the pancreas is  somewhat enlarged measuring 3.1 cm in maximum transverse diameter  (previously 2.6 cm. These findings are compatible with interstitial  edematous pancreatitis, in the appropriate clinical setting. There is no  evidence of peripancreatic inflammatory change or acute peripancreatic  fluid collections.  * ED WBC 8.86, Lipase 146  * S/P 1L LR bolus in ED  * S/P IV Ketorolac 15mg doses for pain control      - Monitor T: afebrile since admission  - Trend WBC no leukocytosis 8.86 12/14  - Follow up Gastroenterology Consult  - Keep patient NPO for now  - Start IV fluid hydration with LR at 150mL/hour. S/P 1L LR bolus in ED  - Monitor pain and keep score at 01/10: tylenol 650mg Q6h PRN and IV Morphine 2mg Q6h PRN  - Check Lipid profile with TG level  - Check RUQ US  - Will order US kidney and bladder to rule out nephrolithiasis since the pain is located at LLQ and RLQ and NOT epigastric  - Check blood culture 12/15   - Check LA  - Trend LFTs within normal. Lipase 12/14  - Monitor BM  - Alcohol Cessation      Watery Non Bloody Non Mucoid Diarrhea   Rule Out Clostridium Difficile in Setting of Recent 7 UTIs s/p multiple Courses of Antibiotics (Most Recent for the last 5 days)  Could Be Related to Edema Around Pancreas  * History of Recurrent UTIs. 7 Episodes during the last year. Most recent UTI 1 week ago s/p Macrobid 100mg BID (received 5 days/ 7days so far)  * Follows with a GYN specialist and PCP but no previous cultures per patient  * ED WBC 8.86, Lipase 146  * S/P 1L LR bolus in ED    - Monitor T: afebrile since admission  - Trend WBC no leukocytosis 8.86 12/14  - Monitor BM  - Please send Clostridium Difficile PCR  - Please send GI PCR  - Please send fecal calprotectin  - Please send stool culture (although low suspicion)   - Start IV fluid hydration with LR at 150mL/hour. S/P 1L LR bolus in ED  - Will start PO Vancomycin if PCR result comes back positive  - Will consider starting imodium PRN if diarrhea persists with no infectious etiology isolated      Recent Urinary Tract Infection  History of Recurrent UTIs  * History of Recurrent UTIs. 7 Episodes during the last year. Most recent UTI 1 week ago s/p Macrobid 100mg BID (received 5 days/ 7days so far)  * Follows with a GYN specialist and PCP but no previous cultures per patient  * ED WBC 8.86, Lipase 146  * Urine Microscopic-Add On (NC) (12.14.21 @ 16:49)    Epithelial Cells: 4 /HPF    Hyaline Casts: 0 /LPF    Bacteria: Negative    Red Blood Cell - Urine: 15 /HPF    White Blood Cell - Urine: 5 /HPF  * Urinalysis (12.14.21 @ 16:49)    pH Urine: 7.0    Glucose Qualitative, Urine: Negative    Blood, Urine: Small    Color: Delia    Urine Appearance: Clear    Bilirubin: Negative    Ketone - Urine: Negative    Specific Gravity: >1.050    Protein, Urine: Trace    Urobilinogen: <2 mg/dL    Nitrite: Negative    Leukocyte Esterase Concentration: Negative  * S/P 1L LR bolus in ED    - Monitor T: afebrile since admission  - Trend WBC no leukocytosis 8.86 12/14  - Follow up urine culture received 12/14  - Check blood culture 12/15  - Start IV Rocephin 1g QD for now since patient was on Macrobid 100mg BID (s/p 5days out of 7 days)   - Start IV fluid hydration with LR at 150mL/hour. S/P 1L LR bolus in ED      Others  - DVT Prophylaxis: Lovenox 40mg Subcutaneously daily  - GI Prophylaxis: Pantoprazole 40mg PO QD   - Diet: NPO for now  - Code Status: Full      Barriers to learning: NO  Discharge Planning: Patient will be discharged once stable  Plan was communicated with patient and medical team      Jose Dewitt MD  PGY -2 Internal Medicine   [facility]   Assessment and Plan  Case of a 51 year old female patient with history of recurrent UTI's who presented to the ED on 12/14 for evaluation of fatigue and lethargy in the setting of recent abdominal pain, diarrhea, and dysuria, found to have evidence of interstitial edematous pancreatitis on imaging, to be admitted for further investigations, management, and monitoring. Currently hemodynamically stable.      Interstitial Edematous Pancreatitis  Less Likely Acute Pancreatitis in Setting of Lack of Inflammatory Changes on Imaging and Lack of Typical Abdominal Pain and The Lipase Level  * Occasional Alcohol Use (Wine). Used to drink more heavily 15 years ago  * No TG level in system  * No US to comment on Roxanne/Choledocholithiasis  * No sepsis on presentation 12/14  * CT Abdomen and Pelvis w/ IV Cont (12.14.21 @ 15:15) Compared with the previous scan of 10/21/2015, the pancreas is  somewhat enlarged measuring 3.1 cm in maximum transverse diameter  (previously 2.6 cm. These findings are compatible with interstitial  edematous pancreatitis, in the appropriate clinical setting. There is no  evidence of peripancreatic inflammatory change or acute peripancreatic  fluid collections.  * ED WBC 8.86, Lipase 146  * S/P 1L LR bolus in ED  * S/P IV Ketorolac 15mg doses for pain control      - Monitor T: afebrile since admission  - Trend WBC no leukocytosis 8.86 12/14  - Follow up Gastroenterology Consult  - Keep patient NPO for now  - Monitor Nausea and start IV Zofran 4mg Q8h PRN  - Start IV fluid hydration with LR at 150mL/hour. S/P 1L LR bolus in ED  - Monitor pain and keep score at 01/10: tylenol 650mg Q6h PRN and IV Morphine 2mg Q6h PRN  - Check Lipid profile with TG level  - Check RUQ US  - Will order US kidney and bladder to rule out nephrolithiasis since the pain is located at LLQ and RLQ and NOT epigastric  - Check blood culture 12/15   - Check LA  - Trend LFTs within normal. Lipase 12/14  - Monitor BM  - Alcohol Cessation      Watery Non Bloody Non Mucoid Diarrhea   Rule Out Clostridium Difficile in Setting of Recent 7 UTIs s/p multiple Courses of Antibiotics (Most Recent for the last 5 days)  Could Be Related to Edema Around Pancreas  * History of Recurrent UTIs. 7 Episodes during the last year. Most recent UTI 1 week ago s/p Macrobid 100mg BID (received 5 days/ 7days so far)  * Follows with a GYN specialist and PCP but no previous cultures per patient  * ED WBC 8.86, Lipase 146  * S/P 1L LR bolus in ED    - Monitor T: afebrile since admission  - Trend WBC no leukocytosis 8.86 12/14  - Monitor BM  - Please send Clostridium Difficile PCR  - Please send GI PCR  - Please send fecal calprotectin  - Please send stool culture (although low suspicion)   - Start IV fluid hydration with LR at 150mL/hour. S/P 1L LR bolus in ED  - Keep patient NPO for now  - Monitor Nausea and start IV Zofran 4mg Q8h PRN  - Will start PO Vancomycin if PCR result comes back positive  - Will consider starting imodium PRN if diarrhea persists with no infectious etiology isolated      Recent Urinary Tract Infection  History of Recurrent UTIs  * History of Recurrent UTIs. 7 Episodes during the last year. Most recent UTI 1 week ago s/p Macrobid 100mg BID (received 5 days/ 7days so far)  * Follows with a GYN specialist and PCP but no previous cultures per patient  * ED WBC 8.86, Lipase 146  * Urine Microscopic-Add On (NC) (12.14.21 @ 16:49)    Epithelial Cells: 4 /HPF    Hyaline Casts: 0 /LPF    Bacteria: Negative    Red Blood Cell - Urine: 15 /HPF    White Blood Cell - Urine: 5 /HPF  * Urinalysis (12.14.21 @ 16:49)    pH Urine: 7.0    Glucose Qualitative, Urine: Negative    Blood, Urine: Small    Color: Delia    Urine Appearance: Clear    Bilirubin: Negative    Ketone - Urine: Negative    Specific Gravity: >1.050    Protein, Urine: Trace    Urobilinogen: <2 mg/dL    Nitrite: Negative    Leukocyte Esterase Concentration: Negative  * S/P 1L LR bolus in ED    - Monitor T: afebrile since admission  - Trend WBC no leukocytosis 8.86 12/14  - Follow up urine culture received 12/14  - Check blood culture 12/15  - Start IV Rocephin 1g QD for now since patient was on Macrobid 100mg BID (s/p 5days out of 7 days)   - Start IV fluid hydration with LR at 150mL/hour. S/P 1L LR bolus in ED      Others  - DVT Prophylaxis: Lovenox 40mg Subcutaneously daily  - GI Prophylaxis: Pantoprazole 40mg PO QD   - Diet: NPO for now  - Code Status: Full      Barriers to learning: NO  Discharge Planning: Patient will be discharged once stable  Plan was communicated with patient and medical team      Jose Dewitt MD  PGY -2 Internal Medicine   [facility]

## 2021-12-14 NOTE — ED PROVIDER NOTE - PHYSICAL EXAMINATION
Physical Exam    Vital Signs: I have reviewed the initial vital signs.  Constitutional: well-nourished, appears stated age, no acute distress  Eyes: Conjunctiva pink, Sclera clear  Cardiovascular: S1 and S2, regular rate, regular rhythm, well-perfused extremities, radial pulses equal and 2+, calves nonttp, equal in size  Respiratory: unlabored respiratory effort, speaking in full sentences, handling oral secretions,  clear to auscultation bilaterally no wheezing, rales and rhonchi  Gastrointestinal: soft, non-tender abdomen, no pulsatile mass, normal bowl sounds no cvat b/l  Integumentary: warm, dry, no rashes, lacerations,  Neurologic: awake, alert x3

## 2021-12-14 NOTE — ED PROVIDER NOTE - CLINICAL SUMMARY MEDICAL DECISION MAKING FREE TEXT BOX
Labs reviewed, cbc ok, lft's wnl, lipase 146.  CT abd c/w edematous pancreatitis.  pt admitted for ivf, gi eval.

## 2021-12-14 NOTE — H&P ADULT - HISTORY OF PRESENT ILLNESS
Location: [facility] ER Hold 012 A ([facility] ER Hold)  Patient Name: JASON KO  Age: 51y  Gender: Female      History of Present Illness    Ms. Ko is a 51 year old female patient known to have:  - Previously Healthy  - History of Recurrent UTIs. 7 Episodes during the last year. Most recent UTI 1 week ago s/p Macrobid 100mg BID (received 5 days/ 7days so far). Follows with a GYN specialist and PCP but no previous cultures per patient      She presented to the ED on 12/14 for evaluation of abdominal pain and fatigue.  History goes back to 3 days PTP when the patient started complaining of LLQ and RLQ abdominal pain.  The pain is sharp in nature, of around 7/10 in intensity at its peak, and non radiating.  Pain has been occurring intermittently over the last 3 days with no clear exacerbating or relieving factors. No recent trauma.  Pain has been associated with watery non bloody non mucoid diarrhea (4 episodes per day for the last 5 days) and dysuria (Chronic per patient, s/p 5 days out of 7 days of Macrobid 100mg BID) but no nausea, vomiting, fever, or chills.      On review of systems, patient reports weight loss of 30 pounds over the last 3 weeks and fatigue over the same period of time (whereby she would spend all day watching television when she usually is very active at baseline). She also reports dry cough, nasal congestion, and intermittent diffuse headaches lasting for few hours since 11/27. She otherwise denies any recent night sweats, other URTI symptoms (sore throat).   No sick contacts.   No recent travel or exposure to recent travelers.      Upon presentation to the ED, the patient was hemodynamically stable:  Vital Signs in ED   - /80 mmHg  - HR 82 bpm  - RR 18 bpm  - SaO2 98% on RA      Investigations   Laboratory Workup  - CBC:                        13.6   8.86  )-----------( 205      ( 14 Dec 2021 12:15 )             43.4     - Chemistry:  12-14    146  |  108  |  16  ----------------------------<  133<H>  4.3   |  24  |  0.7    Ca    9.3      14 Dec 2021 12:15    TPro  7.0  /  Alb  4.5  /  TBili  0.4  /  DBili  x   /  AST  15  /  ALT  18  /  AlkPhos  99  12-14      Microbiological Workup  Urinalysis Basic - ( 14 Dec 2021 16:49 )    Color: Delia / Appearance: Clear / SG: >1.050 / pH: x  Gluc: x / Ketone: Negative  / Bili: Negative / Urobili: <2 mg/dL   Blood: x / Protein: Trace / Nitrite: Negative   Leuk Esterase: Negative / RBC: 15 /HPF / WBC 5 /HPF   Sq Epi: x / Non Sq Epi: 4 /HPF / Bacteria: Negative      Radiological Workup  * CT Abdomen and Pelvis w/ IV Cont (12.14.21 @ 15:15) Compared with the previous scan of 10/21/2015, the pancreas is  somewhat enlarged measuring 3.1 cm in maximum transverse diameter  (previously 2.6 cm. These findings are compatible with interstitial  edematous pancreatitis, in the appropriate clinical setting. There is no  evidence of peripancreatic inflammatory change or acute peripancreatic  fluid collections.      - Patient received 1L LR bolus in ED  - She received IV Ketorolac 15mg doses for pain control  - She will be admitted for further investigations, GI evaluation, and management of interstitial edematous pancreatitis versus UTI.

## 2021-12-14 NOTE — H&P ADULT - NSHPREVIEWOFSYSTEMS_GEN_ALL_CORE
Please refer to above for more details Please refer to above for more details    50 yo woman with a past medical history of Recurrent UTI( currently on macrobid) and anxiety admitted for possible pancreatitis. She reports increased fatigue, abdominal pain (predominantly pelvic) and recurrent vaginal burning for the last three weeks. At the onset of symptoms she report a URI which was treated with azithromycin and resolution of those symptoms. She notes soft stools, not watery, and 30lbs unintentional weight loss despite having an increased appetite. She denied any increased urinary frequency, fevers, chills, nausea or vomiting. Food does not exacerbate any abdominal pain. Her vaginal burning is present even without urination, and is transiently helped with phenazopyridine. Currently she is menopausal and following with her GYN. She states she was also given a vaginal suppository but unclear exactly what it is. No history of easy bruising, excessive bleeding, GI bleeds.  CT a/p noted pancreatic edema, Lipase of 142, mild hyperglycemia, though LFTs are normal and no CBC abnormalities. Tolerating food well at bedside. She also denied any vaginal lesions, discharge, or rashes.

## 2021-12-14 NOTE — H&P ADULT - NSHPPHYSICALEXAM_GEN_ALL_CORE
- Physical Exam in ED  * General Appearance: Alert, cooperative, interactive, oriented to time, place, and person, in no acute distress  * Head: Normocephalic, without obvious abnormality, atraumatic  * Eyes: PERRL, conjunctiva/corneas clear, EOM's intact, fundi benign, both eyes  * Neck: Supple, symmetrical, trachea midline, no adenopathy;   * Thyroid:  No enlargement/tenderness/nodules; no carotid bruit or JVD  * Back: Symmetric, no curvature, ROM normal, no CVA tenderness  * Lungs: Respirations unlabored, Good bilateral air entry, normal breath sounds (Clear to auscultation bilaterally, no audible wheezes, crackles, or rhonchi)  * Heart: Regular Rate and Rhythm, normal S1 and S2, no audible murmur, rub, or gallop  * Abdomen: Symmetric, mildly distended, soft, non-tender, no flank tenderness, bowel sounds active all four quadrants, no masses, no organomegaly (no hepatosplenomegaly)  * Extremities: Extremities normal, atraumatic, no cyanosis, no lower extremity pitting edema bilaterally, adequate dorsalis pedis pulses  * Pulses: 2+ and symmetric all extremities  * Skin: Skin color, texture, turgor normal, no rashes or lesions  * Lymph nodes: Cervical, supraclavicular, and axillary nodes normal  * Neurologic: CNII-XII intact, normal strength, sensation and reflexes throughout

## 2021-12-14 NOTE — H&P ADULT - ATTENDING COMMENTS
Patient seen and examined on 12/14/2021 at 21:33    HPI:  50 yo woman with a past medical history of Recurrent UTI( currently on macrobid) and anxiety admitted for possible pancreatitis. She reports increased fatigue, abdominal pain (predominantly pelvic) and recurrent vaginal burning for the last three weeks. At the onset of symptoms she report a URI which was treated with azithromycin and resolution of those symptoms. She notes soft stools, not watery, and 30lbs unintentional weight loss despite having an increased appetite. She denied any increased GERD, urinary frequency, fevers, chills, nausea or vomiting. Food does not exacerbate any abdominal pain. Her vaginal burning is present even without urination, and is transiently helped with phenazopyridine. Currently she is menopausal and following with her GYN. She states she was also given a vaginal suppository but unclear exactly what it is. No history of easy bruising, excessive bleeding, GI bleeds.  CT a/p noted pancreatic edema, Lipase of 142, mild hyperglycemia, though LFTs are normal and no CBC abnormalities. Tolerating food well at bedside. She also denied any vaginal lesions, discharge, or rashes.    REVIEW OF SYSTEMS:  CONSTITUTIONAL:  +fatigue, No weakness, fevers, chills, night sweats, + weight loss  EYES/ENT: No visual changes. No vertigo or dysphagia  NECK: No neck pain or stiffness  RESPIRATORY: No cough, wheezing, hemoptysis. No shortness of breath  CARDIOVASCULAR: No chest pain or palpitations. No lower extremity edema  GASTROINTESTINAL: +abdominal pain. No nausea, vomiting, diarrhea, or hematemesis  GENITOURINARY: vaginal burning, No dysuria or hematuria   NEUROLOGICAL: No focal numbness or weakness  SKIN: No rashes or itching  HEMATOLOGIC: No easy bruising or prolonged bleeding.      PHYSICAL EXAM:  GENERAL: NAD, obese, Non-toxic, stated age   HEAD:  Atraumatic, Normocephalic  EYES: EOMI, Sclera White   NECK: Supple, No JVD  CHEST/LUNG: Clear to auscultation bilaterally; No wheezing, rhonchi, or crackles  HEART: Regular rate and rhythm; s1, s2, No murmurs, rubs, or gallops  ABDOMEN: Soft, epigastric, LUQ, and pelvic ttp, Nondistended; Bowel sounds present, No rebound or guarding noted   EXTREMITIES:  No lower extremity edema or calf tenderness to palpation.  No clubbing or cyanosis  PSYCH: AAOx3, pleasant, cooperative, not anxious  NEUROLOGY: 5/5 strength in all extremities, no downward drift. Sensation grossly intact.   SKIN: No rashes or lesions      ASSESSMENT AND PLAN:  Fatigue, weight loss, and abdominal pain  ?Mild pancreatitis --> pancreatic edema on CT and elevated lipase  Mild hyperglycemia   Hepatic steatosis   -Cont with LR at 150cc/hr, full liquid diet (eating well)  -Check lipids and HBa1c (weight loss, fatigue, and recurrent UTI can be from underlying DM)   -GI eval for the pancreatic edema + weight loss   -Macrobid is class IV for med. induced pancreatitis. Hold off on IgG testing for now (LFTs normal)  -f/u RUQ sono   -Stool studies sent by resident, she reports primarily soft stools, not watery    Recurrent UTI  -Continue with rocephin for now  -Follow up urine cultures (was on macrobid since 12/9)  -Cont with phenazopyridine     Persistent vaginal burning: may be UTI related but possibly related to menopause  -GYN eval   -?May benefit from estrogen cream/suppository  -Her vaginal burning is her primary complaint     Anxiety: cont with clonazepam PRN    DVT ppx: Lovenox/Heparin  GI ppx: Not indicated  GOC: Full code.    My note supersedes the residents in the event of a discrepancy.

## 2021-12-14 NOTE — ED ADULT NURSE NOTE - OBJECTIVE STATEMENT
pt presents to ED with multiple complaints. C/o headache, weakness, runny nose, sneezing, and burning with urination. Pt admits to sick contacts including someone that is + FLU. symptoms ongoing x 2 weeks. pt had blood work done OP, pt also completed Zpack and currently taking Nitrofurantoin with 3 doses left. pt had fever but had been afebrile. PMH smoker, Anxiety

## 2021-12-15 LAB
A1C WITH ESTIMATED AVERAGE GLUCOSE RESULT: 4.8 % — SIGNIFICANT CHANGE UP (ref 4–5.6)
ALBUMIN SERPL ELPH-MCNC: 4 G/DL — SIGNIFICANT CHANGE UP (ref 3.5–5.2)
ALP SERPL-CCNC: 90 U/L — SIGNIFICANT CHANGE UP (ref 30–115)
ALT FLD-CCNC: 15 U/L — SIGNIFICANT CHANGE UP (ref 0–41)
ANION GAP SERPL CALC-SCNC: 12 MMOL/L — SIGNIFICANT CHANGE UP (ref 7–14)
AST SERPL-CCNC: 13 U/L — SIGNIFICANT CHANGE UP (ref 0–41)
BASOPHILS # BLD AUTO: 0.03 K/UL — SIGNIFICANT CHANGE UP (ref 0–0.2)
BASOPHILS NFR BLD AUTO: 0.4 % — SIGNIFICANT CHANGE UP (ref 0–1)
BILIRUB SERPL-MCNC: 0.3 MG/DL — SIGNIFICANT CHANGE UP (ref 0.2–1.2)
BUN SERPL-MCNC: 19 MG/DL — SIGNIFICANT CHANGE UP (ref 10–20)
CALCIUM SERPL-MCNC: 8.7 MG/DL — SIGNIFICANT CHANGE UP (ref 8.5–10.1)
CHLORIDE SERPL-SCNC: 107 MMOL/L — SIGNIFICANT CHANGE UP (ref 98–110)
CHOLEST SERPL-MCNC: 165 MG/DL — SIGNIFICANT CHANGE UP
CO2 SERPL-SCNC: 24 MMOL/L — SIGNIFICANT CHANGE UP (ref 17–32)
CREAT SERPL-MCNC: 0.6 MG/DL — LOW (ref 0.7–1.5)
CRP SERPL-MCNC: 3 MG/L — SIGNIFICANT CHANGE UP
CULTURE RESULTS: SIGNIFICANT CHANGE UP
EOSINOPHIL # BLD AUTO: 0.16 K/UL — SIGNIFICANT CHANGE UP (ref 0–0.7)
EOSINOPHIL NFR BLD AUTO: 2 % — SIGNIFICANT CHANGE UP (ref 0–8)
ERYTHROCYTE [SEDIMENTATION RATE] IN BLOOD: 14 MM/HR — SIGNIFICANT CHANGE UP (ref 0–20)
ESTIMATED AVERAGE GLUCOSE: 91 MG/DL — SIGNIFICANT CHANGE UP (ref 68–114)
GLUCOSE SERPL-MCNC: 107 MG/DL — HIGH (ref 70–99)
HCT VFR BLD CALC: 39.1 % — SIGNIFICANT CHANGE UP (ref 37–47)
HDLC SERPL-MCNC: 43 MG/DL — LOW
HGB BLD-MCNC: 12.2 G/DL — SIGNIFICANT CHANGE UP (ref 12–16)
IMM GRANULOCYTES NFR BLD AUTO: 0.1 % — SIGNIFICANT CHANGE UP (ref 0.1–0.3)
LACTATE SERPL-SCNC: 0.8 MMOL/L — SIGNIFICANT CHANGE UP (ref 0.7–2)
LIDOCAIN IGE QN: 139 U/L — HIGH (ref 7–60)
LIPID PNL WITH DIRECT LDL SERPL: 101 MG/DL — HIGH
LYMPHOCYTES # BLD AUTO: 2.69 K/UL — SIGNIFICANT CHANGE UP (ref 1.2–3.4)
LYMPHOCYTES # BLD AUTO: 33.7 % — SIGNIFICANT CHANGE UP (ref 20.5–51.1)
MAGNESIUM SERPL-MCNC: 2 MG/DL — SIGNIFICANT CHANGE UP (ref 1.8–2.4)
MCHC RBC-ENTMCNC: 30 PG — SIGNIFICANT CHANGE UP (ref 27–31)
MCHC RBC-ENTMCNC: 31.2 G/DL — LOW (ref 32–37)
MCV RBC AUTO: 96.3 FL — SIGNIFICANT CHANGE UP (ref 81–99)
MONOCYTES # BLD AUTO: 0.57 K/UL — SIGNIFICANT CHANGE UP (ref 0.1–0.6)
MONOCYTES NFR BLD AUTO: 7.1 % — SIGNIFICANT CHANGE UP (ref 1.7–9.3)
NEUTROPHILS # BLD AUTO: 4.53 K/UL — SIGNIFICANT CHANGE UP (ref 1.4–6.5)
NEUTROPHILS NFR BLD AUTO: 56.7 % — SIGNIFICANT CHANGE UP (ref 42.2–75.2)
NON HDL CHOLESTEROL: 122 MG/DL — SIGNIFICANT CHANGE UP
NRBC # BLD: 0 /100 WBCS — SIGNIFICANT CHANGE UP (ref 0–0)
PHOSPHATE SERPL-MCNC: 3.7 MG/DL — SIGNIFICANT CHANGE UP (ref 2.1–4.9)
PLATELET # BLD AUTO: 191 K/UL — SIGNIFICANT CHANGE UP (ref 130–400)
POTASSIUM SERPL-MCNC: 4 MMOL/L — SIGNIFICANT CHANGE UP (ref 3.5–5)
POTASSIUM SERPL-SCNC: 4 MMOL/L — SIGNIFICANT CHANGE UP (ref 3.5–5)
PROT SERPL-MCNC: 6.2 G/DL — SIGNIFICANT CHANGE UP (ref 6–8)
RBC # BLD: 4.06 M/UL — LOW (ref 4.2–5.4)
RBC # FLD: 13.3 % — SIGNIFICANT CHANGE UP (ref 11.5–14.5)
SODIUM SERPL-SCNC: 143 MMOL/L — SIGNIFICANT CHANGE UP (ref 135–146)
SPECIMEN SOURCE: SIGNIFICANT CHANGE UP
TRIGL SERPL-MCNC: 136 MG/DL — SIGNIFICANT CHANGE UP
TSH SERPL-MCNC: 1.15 UIU/ML — SIGNIFICANT CHANGE UP (ref 0.27–4.2)
WBC # BLD: 7.99 K/UL — SIGNIFICANT CHANGE UP (ref 4.8–10.8)
WBC # FLD AUTO: 7.99 K/UL — SIGNIFICANT CHANGE UP (ref 4.8–10.8)

## 2021-12-15 PROCEDURE — 99223 1ST HOSP IP/OBS HIGH 75: CPT

## 2021-12-15 PROCEDURE — 70450 CT HEAD/BRAIN W/O DYE: CPT | Mod: 26

## 2021-12-15 PROCEDURE — 99233 SBSQ HOSP IP/OBS HIGH 50: CPT

## 2021-12-15 RX ORDER — TRAMADOL HYDROCHLORIDE 50 MG/1
50 TABLET ORAL ONCE
Refills: 0 | Status: DISCONTINUED | OUTPATIENT
Start: 2021-12-15 | End: 2021-12-15

## 2021-12-15 RX ORDER — ACETAMINOPHEN 500 MG
650 TABLET ORAL ONCE
Refills: 0 | Status: COMPLETED | OUTPATIENT
Start: 2021-12-15 | End: 2021-12-15

## 2021-12-15 RX ADMIN — Medication 100 MILLIGRAM(S): at 06:45

## 2021-12-15 RX ADMIN — TRAMADOL HYDROCHLORIDE 50 MILLIGRAM(S): 50 TABLET ORAL at 10:36

## 2021-12-15 RX ADMIN — Medication 15 MILLIGRAM(S): at 13:03

## 2021-12-15 RX ADMIN — SODIUM CHLORIDE 150 MILLILITER(S): 9 INJECTION, SOLUTION INTRAVENOUS at 06:45

## 2021-12-15 RX ADMIN — Medication 650 MILLIGRAM(S): at 15:24

## 2021-12-15 RX ADMIN — Medication 2 MILLIGRAM(S): at 22:56

## 2021-12-15 RX ADMIN — Medication 100 MILLIGRAM(S): at 14:54

## 2021-12-15 RX ADMIN — Medication 650 MILLIGRAM(S): at 11:07

## 2021-12-15 RX ADMIN — TRAMADOL HYDROCHLORIDE 50 MILLIGRAM(S): 50 TABLET ORAL at 16:46

## 2021-12-15 RX ADMIN — PANTOPRAZOLE SODIUM 40 MILLIGRAM(S): 20 TABLET, DELAYED RELEASE ORAL at 06:45

## 2021-12-15 RX ADMIN — Medication 650 MILLIGRAM(S): at 14:54

## 2021-12-15 RX ADMIN — Medication 15 MILLIGRAM(S): at 13:33

## 2021-12-15 RX ADMIN — Medication 100 MILLIGRAM(S): at 21:31

## 2021-12-15 RX ADMIN — Medication 650 MILLIGRAM(S): at 10:37

## 2021-12-15 RX ADMIN — TRAMADOL HYDROCHLORIDE 50 MILLIGRAM(S): 50 TABLET ORAL at 16:16

## 2021-12-15 RX ADMIN — CEFTRIAXONE 100 MILLIGRAM(S): 500 INJECTION, POWDER, FOR SOLUTION INTRAMUSCULAR; INTRAVENOUS at 21:39

## 2021-12-15 RX ADMIN — TRAMADOL HYDROCHLORIDE 50 MILLIGRAM(S): 50 TABLET ORAL at 04:26

## 2021-12-15 RX ADMIN — TRAMADOL HYDROCHLORIDE 50 MILLIGRAM(S): 50 TABLET ORAL at 07:30

## 2021-12-15 RX ADMIN — SODIUM CHLORIDE 250 MILLILITER(S): 9 INJECTION, SOLUTION INTRAVENOUS at 13:02

## 2021-12-15 NOTE — CONSULT NOTE ADULT - REASON FOR ADMISSION
Fatigue and Abdominal Pain Otezla Counseling: The side effects of Otezla were discussed with the patient, including but not limited to worsening or new depression, weight loss, diarrhea, nausea, upper respiratory tract infection, and headache. Patient instructed to call the office should any adverse effect occur.  The patient verbalized understanding of the proper use and possible adverse effects of Otezla.  All the patient's questions and concerns were addressed.

## 2021-12-15 NOTE — CONSULT NOTE ADULT - ASSESSMENT
Case of a 51 year old female patient with history of recurrent UTI's who presented to the ED on 12/14 for evaluation of fatigue and lethargy in the setting of recent abdominal pain, diarrhea, and dysuria, found to have evidence of interstitial edematous pancreatitis on imaging, to be admitted for further investigations, management, and monitoring. Currently hemodynamically stable.      Interstitial Edematous Pancreatitis  Less Likely Acute Pancreatitis in Setting of Lack of Inflammatory Changes on Imaging and Lack of Typical Abdominal Pain and The Lipase Level  * Occasional Alcohol Use (Wine). Used to drink more heavily 15 years ago  * No TG level in system  * No US to comment on Roxanne/Choledocholithiasis  * No sepsis on presentation 12/14  * CT Abdomen and Pelvis w/ IV Cont (12.14.21 @ 15:15) Compared with the previous scan of 10/21/2015, the pancreas is  somewhat enlarged measuring 3.1 cm in maximum transverse diameter  (previously 2.6 cm. These findings are compatible with interstitial  edematous pancreatitis, in the appropriate clinical setting. There is no  evidence of peripancreatic inflammatory change or acute peripancreatic  fluid collections.  * ED WBC 8.86, Lipase 146      - Monitor T: afebrile since admission  - Trend WBC no leukocytosis 8.86 12/14  - Keep patient NPO for now  - Monitor Nausea and start IV Zofran 4mg Q8h PRN  - Start IV fluid hydration with LR at 150mL/hour. S/P 1L LR bolus in ED  - Check Lipid profile with TG level  - Check RUQ US  - Trend LFTs within normal. Lipase 12/14  - Monitor BM  - Alcohol Cessation      Watery Non Bloody Non Mucoid Diarrhea   Rule Out Clostridium Difficile in Setting of Recent 7 UTIs s/p multiple Courses of Antibiotics (Most Recent for the last 5 days)  Could Be Related to Edema Around Pancreas  * History of Recurrent UTIs. 7 Episodes during the last year. Most recent UTI 1 week ago s/p Macrobid 100mg BID (received 5 days/ 7days so far)    - Monitor T: afebrile since admission  - Trend WBC no leukocytosis 8.86 12/14  - Monitor BM  - Please send Clostridium Difficile PCR  - Please send GI PCR  - Please send fecal calprotectin  - Please send stool culture (although low suspicion)   - Keep patient NPO for now  - Monitor Nausea and start IV Zofran 4mg Q8h PRN     Case of a 51 year old female patient with history of recurrent UTI's who presented to the ED on 12/14 for evaluation of fatigue and lethargy in the setting of recent abdominal pain, diarrhea, and dysuria, found to have evidence of interstitial edematous pancreatitis on imaging, GI consulted for evaluation      Interstitial Edematous Pancreatitis  Less Likely Acute Pancreatitis in Setting of Lack of Inflammatory Changes on Imaging and Lack of Typical Abdominal Pain and The Lipase Level  * Occasional Alcohol Use (Wine). Used to drink more heavily 15 years ago  * No TG level in system  * No US to comment on Roxanne/Choledocholithiasis  * No sepsis on presentation 12/14  * CT Abdomen and Pelvis w/ IV Cont (12.14.21 @ 15:15) Compared with the previous scan of 10/21/2015, the pancreas is  somewhat enlarged measuring 3.1 cm in maximum transverse diameter  (previously 2.6 cm. These findings are compatible with interstitial  edematous pancreatitis, in the appropriate clinical setting. There is no  evidence of peripancreatic inflammatory change or acute peripancreatic  fluid collections.  * ED WBC 8.86, Lipase 146    Hemodynamically stable/ No signs of sepsis  BISAP score :0  Work up till now : No  Gall stones, calcium 8.7  Pending TG, , IgG4  Medications reviewed    -Monitor daily HCT/BUN/CR/urine output.  -Monitor pain scale everyday and give pain medications accordingly.  - Monitor T: afebrile since admission  - Trend WBC no leukocytosis 8.86 12/14  - Keep patient NPO for now  - Monitor Nausea and start IV Zofran 4mg Q8h PRN  - Start IV fluid hydration with LR at 150mL/hour. S/P 1L LR bolus in ED  - Check Lipid profile with TG level  - RUQ US : normal  - Trend LFTs within normal. Lipase 12/14  - Monitor BM  - Alcohol Cessation  - age appropriate cancer screening.       Watery Non Bloody Non Mucoid Diarrhea   Rule Out Clostridium Difficile in Setting of Recent 7 UTIs s/p multiple Courses of Antibiotics (Most Recent for the last 5 days)  Could Be Related to Edema Around Pancreas  * History of Recurrent UTIs. 7 Episodes during the last year. Most recent UTI 1 week ago s/p Macrobid 100mg BID (received 5 days/ 7days so far)    - Monitor T: afebrile since admission  - Trend WBC no leukocytosis 8.86 12/14  - Monitor BM  - Please send Clostridium Difficile PCR  - Please send GI PCR  - Please send fecal calprotectin  - Please send stool culture.  - Keep patient NPO for now  - Monitor Nausea and start IV Zofran 4mg Q8h PRN     Case of a 51 year old female patient with history of recurrent UTI's who presented to the ED on 12/14 for evaluation of fatigue and lethargy in the setting of recent abdominal pain, diarrhea, and dysuria, found to have evidence of interstitial edematous pancreatitis on imaging, GI consulted for evaluation      Interstitial Edematous Pancreatitis  Less Likely Acute Pancreatitis in Setting of Lack of Inflammatory Changes on Imaging and Lack of Typical Abdominal Pain and The Lipase Level  * Occasional Alcohol Use (Wine). Used to drink more heavily 15 years ago  * No TG level in system  * No US to comment on Roxanne/Choledocholithiasis  * No sepsis on presentation 12/14  * CT Abdomen and Pelvis w/ IV Cont (12.14.21 @ 15:15) Compared with the previous scan of 10/21/2015, the pancreas is  somewhat enlarged measuring 3.1 cm in maximum transverse diameter  (previously 2.6 cm. These findings are compatible with interstitial  edematous pancreatitis, in the appropriate clinical setting. There is no  evidence of peripancreatic inflammatory change or acute peripancreatic  fluid collections.  * ED WBC 8.86, Lipase 146    Hemodynamically stable/ No signs of sepsis  BISAP score :0  Work up till now : No  Gall stones, calcium 8.7   TG level 136  Medications reviewed    -Monitor daily HCT/BUN/CR/urine output.  -Monitor pain scale everyday and give pain medications accordingly.  - Monitor T: afebrile since admission  - Trend WBC no leukocytosis 8.86 12/14  - Keep patient NPO for now  - Monitor Nausea and start IV Zofran 4mg Q8h PRN  - Start IV fluid hydration with LR at 150mL/hour. S/P 1L LR bolus in ED  - RUQ US : normal  - Trend LFTs within normal. Lipase 12/14  - Monitor BM  - Alcohol Cessation  - age appropriate cancer screening.       Watery Non Bloody Non Mucoid Diarrhea   Rule Out Clostridium Difficile in Setting of Recent 7 UTIs s/p multiple Courses of Antibiotics (Most Recent for the last 5 days)  Could Be Related to Edema Around Pancreas  * History of Recurrent UTIs. 7 Episodes during the last year. Most recent UTI 1 week ago s/p Macrobid 100mg BID (received 5 days/ 7days so far)    - Monitor T: afebrile since admission  - Trend WBC no leukocytosis 8.86 12/14  - Monitor BM  - Please send Clostridium Difficile PCR  - Please send GI PCR  - Please send fecal calprotectin  - Please send stool culture.  - Keep patient NPO for now  - Monitor Nausea and start IV Zofran 4mg Q8h PRN     Case of a 51 year old female patient with history of recurrent UTI's who presented to the ED on 12/14 for evaluation of fatigue and lethargy in the setting of recent abdominal pain, diarrhea, and dysuria, found to have evidence of interstitial edematous pancreatitis on imaging, GI consulted for evaluation      Interstitial Edematous Pancreatitis  Less Likely Acute Pancreatitis in Setting of Lack of Inflammatory Changes on Imaging and Lack of Typical Abdominal Pain and The Lipase Level  * Occasional Alcohol Use (Wine). Used to drink more heavily 15 years ago  * No TG level in system  * No US to comment on Roxanne/Choledocholithiasis  * No sepsis on presentation 12/14  * CT Abdomen and Pelvis w/ IV Cont (12.14.21 @ 15:15) Compared with the previous scan of 10/21/2015, the pancreas is  somewhat enlarged measuring 3.1 cm in maximum transverse diameter  (previously 2.6 cm. These findings are compatible with interstitial  edematous pancreatitis, in the appropriate clinical setting. There is no  evidence of peripancreatic inflammatory change or acute peripancreatic  fluid collections.  * ED WBC 8.86, Lipase 146    Hemodynamically stable/ No signs of sepsis  BISAP score :0  Work up till now : No  Gall stones, calcium 8.7   TG level 136  Medications reviewed    -Monitor daily HCT/BUN/CR/urine output.  -Monitor pain scale everyday and give pain medications accordingly.  - Monitor T: afebrile since admission  - Trend WBC no leukocytosis 8.86 12/14  - Keep patient NPO for now  - Monitor Nausea and start IV Zofran 4mg Q8h PRN  - Increase IV fluid hydration with LR to 250mL/hour.   - RUQ US : normal  - Trend LFTs   - Lipase 139 12/14  - Monitor BM  - Alcohol Cessation  - Obtain IgG4, .  - MRI abdomen/pelvis as outpatient after resolution of acute pancreatitis.   - PPI      Watery Non Bloody Non Mucoid Diarrhea   Rule Out Clostridium Difficile in Setting of Recent 7 UTIs s/p multiple Courses of Antibiotics (Most Recent for the last 5 days)  Could Be Related to Edema Around Pancreas  * History of Recurrent UTIs. 7 Episodes during the last year. Most recent UTI 1 week ago s/p Macrobid 100mg BID (received 5 days/ 7days so far)    - Monitor T: afebrile since admission  - Trend WBC no leukocytosis 8.86 12/14  - Monitor BM  - Please send Clostridium Difficile PCR  - Please send GI PCR  - Please send fecal calprotectin  - Please send stool culture.  - Keep patient NPO for now  - Monitor Nausea and start IV Zofran 4mg Q8h PRN    Case discussed with Attending Dr Martini Case of a 51 year old female patient with history of recurrent UTI's who presented to the ED on 12/14 for evaluation of fatigue and lethargy in the setting of recent abdominal pain, diarrhea, and dysuria, found to have evidence of interstitial edematous pancreatitis on imaging, GI consulted for evaluation      Interstitial Edematous Pancreatitis* Occasional Alcohol Use (Wine). Used to drink more heavily 15 years ago  * No TG level in system  * No US to comment on Roxanne/Choledocholithiasis  * No sepsis on presentation 12/14  * CT Abdomen and Pelvis w/ IV Cont (12.14.21 @ 15:15) Compared with the previous scan of 10/21/2015, the pancreas is  somewhat enlarged measuring 3.1 cm in maximum transverse diameter  (previously 2.6 cm. These findings are compatible with interstitial  edematous pancreatitis, in the appropriate clinical setting.* ED WBC 8.86, Lipase 146    Hemodynamically stable/ No signs of sepsis  BISAP score :0  Work up till now : No  Gall stones, calcium 8.7   TG level 136  Medications reviewed    -Monitor daily HCT/BUN/CR/urine output.  -Monitor pain scale everyday and give pain medications accordingly.  - Monitor T: afebrile since admission  - Trend WBC no leukocytosis 8.86 12/14  - Keep patient NPO for now  - Monitor Nausea and start IV Zofran 4mg Q8h PRN  - Increase IV fluid hydration with LR to 250mL/hour.   - RUQ US : normal  - Trend LFTs   - Lipase 139 12/14  - Monitor BM  - Alcohol Cessation  - Obtain IgG4, .  - MRI abdomen/pelvis as outpatient after resolution of acute pancreatitis.   - PPI      Watery Non Bloody Non Mucoid Diarrhea   Rule Out Clostridium Difficile in Setting of Recent 7 UTIs s/p multiple Courses of Antibiotics (Most Recent for the last 5 days)  Could Be Related to Edema Around Pancreas  * History of Recurrent UTIs. 7 Episodes during the last year. Most recent UTI 1 week ago s/p Macrobid 100mg BID (received 5 days/ 7days so far)    - Monitor T: afebrile since admission  - Trend WBC no leukocytosis 8.86 12/14  - Monitor BM  - Please send Clostridium Difficile PCR  - Please send GI PCR  - Please send fecal calprotectin  - Please send stool culture.  - Keep patient NPO for now  - Monitor Nausea and start IV Zofran 4mg Q8h PRN    Case discussed with Attending Dr Martini

## 2021-12-15 NOTE — CONSULT NOTE ADULT - SUBJECTIVE AND OBJECTIVE BOX
Gastroenterology Consultation:    Patient is a 51y old  Female who presents with a chief complaint of Fatigue and Abdominal Pain (14 Dec 2021 19:47)      Admitted on: 12-14-21  HPI:  Location: [facility] ER Hold 012 A ([facility] ER Hold)  Patient Name: JASON KO  Age: 51y  Gender: Female      History of Present Illness    Ms. Ko is a 51 year old female patient known to have:  - Previously Healthy  - History of Recurrent UTIs. 7 Episodes during the last year. Most recent UTI 1 week ago s/p Macrobid 100mg BID (received 5 days/ 7days so far). Follows with a GYN specialist and PCP but no previous cultures per patient      She presented to the ED on 12/14 for evaluation of abdominal pain and fatigue.  History goes back to 3 days PTP when the patient started complaining of LLQ and RLQ abdominal pain.  The pain is sharp in nature, of around 7/10 in intensity at its peak, and non radiating.  Pain has been occurring intermittently over the last 3 days with no clear exacerbating or relieving factors. No recent trauma.  Pain has been associated with watery non bloody non mucoid diarrhea (4 episodes per day for the last 5 days) and dysuria (Chronic per patient, s/p 5 days out of 7 days of Macrobid 100mg BID) but no nausea, vomiting, fever, or chills.      On review of systems, patient reports weight loss of 30 pounds over the last 3 weeks and fatigue over the same period of time (whereby she would spend all day watching television when she usually is very active at baseline). She also reports dry cough, nasal congestion, and intermittent diffuse headaches lasting for few hours since 11/27. She otherwise denies any recent night sweats, other URTI symptoms (sore throat).   No sick contacts.   No recent travel or exposure to recent travelers.      Upon presentation to the ED, the patient was hemodynamically stable:  Vital Signs in ED   - /80 mmHg  - HR 82 bpm  - RR 18 bpm  - SaO2 98% on RA      Investigations   Laboratory Workup  - CBC:                        13.6   8.86  )-----------( 205      ( 14 Dec 2021 12:15 )             43.4     - Chemistry:  12-14    146  |  108  |  16  ----------------------------<  133<H>  4.3   |  24  |  0.7    Ca    9.3      14 Dec 2021 12:15    TPro  7.0  /  Alb  4.5  /  TBili  0.4  /  DBili  x   /  AST  15  /  ALT  18  /  AlkPhos  99  12-14      Microbiological Workup  Urinalysis Basic - ( 14 Dec 2021 16:49 )    Color: Delia / Appearance: Clear / SG: >1.050 / pH: x  Gluc: x / Ketone: Negative  / Bili: Negative / Urobili: <2 mg/dL   Blood: x / Protein: Trace / Nitrite: Negative   Leuk Esterase: Negative / RBC: 15 /HPF / WBC 5 /HPF   Sq Epi: x / Non Sq Epi: 4 /HPF / Bacteria: Negative      Radiological Workup  * CT Abdomen and Pelvis w/ IV Cont (12.14.21 @ 15:15) Compared with the previous scan of 10/21/2015, the pancreas is  somewhat enlarged measuring 3.1 cm in maximum transverse diameter  (previously 2.6 cm. These findings are compatible with interstitial  edematous pancreatitis, in the appropriate clinical setting. There is no  evidence of peripancreatic inflammatory change or acute peripancreatic  fluid collections.      - Patient received 1L LR bolus in ED  - She received IV Ketorolac 15mg doses for pain control  - She will be admitted for further investigations, GI evaluation, and management of interstitial edematous pancreatitis versus UTI.     (14 Dec 2021 19:47)      Prior EGD:  Prior Colonoscopy:      PAST MEDICAL & SURGICAL HISTORY:  Anxiety        FAMILY HISTORY:      Social History:  Tobacco:  Alcohol:  Drugs:    Home Medications:  clonazePAM 2 mg oral tablet: 2 milligram(s) orally once a day (at bedtime) (14 Dec 2021 20:16)    MEDICATIONS  (STANDING):  cefTRIAXone   IVPB 1000 milliGRAM(s) IV Intermittent every 24 hours  chlorhexidine 4% Liquid 1 Application(s) Topical <User Schedule>  clonazePAM  Tablet 2 milliGRAM(s) Oral at bedtime  enoxaparin Injectable 40 milliGRAM(s) SubCutaneous daily  lactated ringers. 1000 milliLiter(s) (150 mL/Hr) IV Continuous <Continuous>  pantoprazole    Tablet 40 milliGRAM(s) Oral before breakfast  phenazopyridine 100 milliGRAM(s) Oral every 8 hours    MEDICATIONS  (PRN):  acetaminophen     Tablet .. 650 milliGRAM(s) Oral every 6 hours PRN Mild Pain (1 - 3)  ketorolac   Injectable 15 milliGRAM(s) IV Push three times a day PRN Moderate Pain (4 - 6)  ondansetron Injectable 4 milliGRAM(s) IV Push every 8 hours PRN Nausea and/or Vomiting  traMADol 50 milliGRAM(s) Oral three times a day PRN Severe Pain (7 - 10)      Allergies  No Known Allergies      Review of Systems:   Constitutional:  No Fever, No Chills  ENT/Mouth:  No Hearing Changes,  No Difficulty Swallowing  Eyes:  No Eye Pain, No Vision Changes  Cardiovascular:  No Chest Pain, No Palpitations  Respiratory:  No Cough, No Dyspnea  Gastrointestinal:  As described in HPI  Musculoskeletal:  No Joint Swelling, No Back Pain  Skin:  No Skin Lesions, No Jaundice  Neuro:  No Syncope, No Dizziness  Heme/Lymph:  No Bruising, No Bleeding.          Physical Examination:  T(C): 35.7 (12-15-21 @ 07:44), Max: 36.3 (12-14-21 @ 16:21)  HR: 69 (12-15-21 @ 07:44) (67 - 82)  BP: 124/62 (12-15-21 @ 07:44) (124/62 - 164/80)  RR: 18 (12-15-21 @ 07:44) (18 - 18)  SpO2: 95% (12-15-21 @ 07:44) (95% - 98%)        Constitutional: No acute distress.  Eyes:. Conjunctivae are clear, Sclera is non-icteric.  Ears Nose and Throat: The external ears are normal appearing,  Oral mucosa is pink and moist.  Respiratory:  No signs of respiratory distress. Lung sounds are clear bilaterally.  Cardiovascular:  S1 S2, Regular rate and rhythm.  GI: Abdomen is soft, symmetric, and non-tender without distention. There are no visible lesions or scars. Bowel sounds are present and normoactive in all four quadrants. No masses, hepatomegaly, or splenomegaly are noted.   Neuro: No Tremor, No involuntary movements  Skin: No rashes, No Jaundice.          Data:                        13.6   8.86  )-----------( 205      ( 14 Dec 2021 12:15 )             43.4     Hgb Trend:  13.6  12-14-21 @ 12:15      12-14    146  |  108  |  16  ----------------------------<  133<H>  4.3   |  24  |  0.7    Ca    9.3      14 Dec 2021 12:15    TPro  7.0  /  Alb  4.5  /  TBili  0.4  /  DBili  x   /  AST  15  /  ALT  18  /  AlkPhos  99  12-14    Liver panel trend:  TBili 0.4   /   AST 15   /   ALT 18   /   AlkP 99   /   Tptn 7.0   /   Alb 4.5    /   DBili --      12-14              Radiology:  CT Abdomen and Pelvis w/ IV Cont:   ACC: [id number] EXAM:  CT ABDOMEN AND PELVIS IC                          PROCEDURE DATE:  12/14/2021          INTERPRETATION:  REASON FOR EXAM / CLINICAL STATEMENT:  Abdominal pain,   diarrhea, weakness, fever. WBC 8.86  Lipase 146  PMHx of recent UTI, URI    TECHNIQUE:  Contiguous axial CT images were obtained from the lower chest   to the pubic symphysis with IV contrast.  Reformatted images in the   coronal and sagittal planes were acquired.      COMPARISON CT: CT scan of the abdomen and pelvis dated 10/21/2015    OTHER STUDIES USED FOR CORRELATION: None.    FINDINGS:    TUBES AND LINES: None.    LOWER CHEST: The lung bases are clear. No pleural or pericardial effusion.    HEPATIC: The liver is normal in size with no evidence of solid massor   bile duct dilatation. Hepatic steatosis is noted. The portal vein is   patent. The hepatic veins are opacified.    BILIARY: The gallbladder is contracted limiting evaluation.    SPLEEN: Unremarkable.    PANCREAS: Compared with the previous scan of 10/21/2015, the pancreas is   somewhat enlarged measuring 3.1 cm in maximum transverse diameter   (previously 2.6 cm. These findings are compatible with interstitial   edematous pancreatitis, in the appropriate clinical setting. There is no   evidence of peripancreatic inflammatory change or acute peripancreatic   fluid collections. Stable subcentimeter cyst lesion in the region of the   mid body.    ADRENAL GLANDS: Stable 2.4 cm hypodense nodule in the right adrenal   gland, unchanged since 2015, likely adenoma. Left adrenal gland is   unremarkable.    KIDNEYS: There is symmetric renal enhancement. No evidence of   hydronephrosis, calcified stones, or solid mass.    ABDOMINOPELVIC NODES: Unremarkable.    PELVIC ORGANS: No evidence of pelvic mass, lymphadenopathy, or fluid   collection.    BLADDER: Unremarkable.    PERITONEUM/MESENTERY/BOWEL: Scattered colonic diverticula are noted with   no evidence of diverticulitis. No evidence of bowel obstruction, colitis,   inflammatory process, or ascites. No pneumoperitoneum.  The appendix is   normal in appearance.    BONES/SOFT TISSUES: Mild degenerative changes of the spine are noted.   Mild anterolisthesis at L4-5. No evidence of acute fracture.    OTHER: Normal caliber aorta.      IMPRESSION:    1. Compared with the previous scan of 10/21/2015, the pancreas is   somewhat enlarged measuring 3.1 cm in maximum transverse diameter   (previously 2.6 cm. These findings are compatible with interstitial   edematous pancreatitis, in the appropriate clinical setting. There is no   evidence of peripancreatic inflammatory change or acute peripancreatic   fluid collections.    --- End of Report ---            NIKIA MATTHEW MD; Attending Interventional Radiologist  This document has been electronically signed. Dec 14 2021  4:18PM (12-14-21 @ 15:15)    US Abdomen Upper Quadrant Right:   ACC: [id number] EXAM:  US ABDOMEN RT UPR QUADRANT                          PROCEDURE DATE:  12/14/2021          INTERPRETATION:  CLINICAL INFORMATION: Pancreatitis.    COMPARISON: CT scan performed 6 hours prior.    TECHNIQUE: Sonography of the rightupper quadrant.    FINDINGS:    Liver: Within normal limits.  Bile ducts: Normal caliber. Common bile duct measures 3 mm.  Gallbladder: Within normal limits.  Pancreas: Visualized portions are within normal limits.  Right kidney: 11.2 cm. No hydronephrosis.  Ascites: None.  IVC: Visualized portions are within normal limits.    IMPRESSION:    Normal right upper quadrant abdominal ultrasound.        --- End of Report ---            TERRIE COPELAND MD; Attending Interventional Radiologist  This document has been electronically signed. Dec 15 2021  7:16AM (12-14-21 @ 20:59)     Gastroenterology Consultation:    Patient is a 51y old  Female who presents with a chief complaint of Fatigue and Abdominal Pain (14 Dec 2021 19:47)      Admitted on: 12-14-21  HPI:  Location: [facility] Hold 012 A ([facility] Hold)  Patient Name: JASON KO  Age: 51y  Gender: Female      History of Present Illness    Ms. Ko is a 51 year old female patient known to have:  - obesity, planned for sleeve gastrectomy  - ANISHA  - History of Recurrent UTIs. 7 Episodes during the last year. Most recent UTI 1 week ago s/p Macrobid 100mg BID (received 5 days/ 7days so far). Follows with a GYN specialist and PCP but no previous cultures per patient      She presented to the ED on 12/14 for evaluation of abdominal pain and fatigue.  History goes back to 3 days PTP when the patient started complaining of LLQ and RLQ abdominal pain.  The pain is sharp in nature, of around 7/10 in intensity at its peak, and non radiating.  Pain has been occurring intermittently over the last 3 days with no clear exacerbating or relieving factors. No recent trauma.  Pain has been associated with watery non bloody non mucoid diarrhea (4 episodes per day for the last 5 days) and dysuria (Chronic per patient, s/p 5 days out of 7 days of Macrobid 100mg BID) but no nausea, vomiting, fever, or chills.      On review of systems, patient reports weight loss of 30 pounds over the last 3 weeks and fatigue over the same period of time (whereby she would spend all day watching television when she usually is very active at baseline). She also reports dry cough, nasal congestion, and intermittent diffuse headaches lasting for few hours since 11/27. She otherwise denies any recent night sweats, other URTI symptoms (sore throat).   No sick contacts.   No recent travel or exposure to recent travelers.      Upon presentation to the ED, the patient was hemodynamically stable:  Vital Signs in ED   - /80 mmHg  - HR 82 bpm  - RR 18 bpm  - SaO2 98% on RA      Investigations   Laboratory Workup  - CBC:                        13.6   8.86  )-----------( 205      ( 14 Dec 2021 12:15 )             43.4     - Chemistry:  12-14    146  |  108  |  16  ----------------------------<  133<H>  4.3   |  24  |  0.7    Ca    9.3      14 Dec 2021 12:15    TPro  7.0  /  Alb  4.5  /  TBili  0.4  /  DBili  x   /  AST  15  /  ALT  18  /  AlkPhos  99  12-14      Microbiological Workup  Urinalysis Basic - ( 14 Dec 2021 16:49 )    Color: Delia / Appearance: Clear / SG: >1.050 / pH: x  Gluc: x / Ketone: Negative  / Bili: Negative / Urobili: <2 mg/dL   Blood: x / Protein: Trace / Nitrite: Negative   Leuk Esterase: Negative / RBC: 15 /HPF / WBC 5 /HPF   Sq Epi: x / Non Sq Epi: 4 /HPF / Bacteria: Negative      Radiological Workup  * CT Abdomen and Pelvis w/ IV Cont (12.14.21 @ 15:15) Compared with the previous scan of 10/21/2015, the pancreas is  somewhat enlarged measuring 3.1 cm in maximum transverse diameter  (previously 2.6 cm. These findings are compatible with interstitial  edematous pancreatitis, in the appropriate clinical setting. There is no  evidence of peripancreatic inflammatory change or acute peripancreatic  fluid collections.      - Patient received 1L LR bolus in ED  - She received IV Ketorolac 15mg doses for pain control  - She will be admitted for further investigations, GI evaluation, and management of interstitial edematous pancreatitis versus UTI.     (14 Dec 2021 19:47)      Prior EGD: done a month ago, records not available, per patient were normal  Prior Colonoscopy: done a month ago, records not available, says they removed 3 polyps, for follow up in 2 years.       PAST MEDICAL & SURGICAL HISTORY:  Anxiety  ANISHA      FAMILY HISTORY:      Social History: lives at home  Tobacco: stopped 15 years ago, 15 PY  Alcohol: stopped 15 years ago  Drugs:none    Home Medications:  clonazePAM 2 mg oral tablet: 2 milligram(s) orally once a day (at bedtime) (14 Dec 2021 20:16)    MEDICATIONS  (STANDING):  cefTRIAXone   IVPB 1000 milliGRAM(s) IV Intermittent every 24 hours  chlorhexidine 4% Liquid 1 Application(s) Topical <User Schedule>  clonazePAM  Tablet 2 milliGRAM(s) Oral at bedtime  enoxaparin Injectable 40 milliGRAM(s) SubCutaneous daily  lactated ringers. 1000 milliLiter(s) (150 mL/Hr) IV Continuous <Continuous>  pantoprazole    Tablet 40 milliGRAM(s) Oral before breakfast  phenazopyridine 100 milliGRAM(s) Oral every 8 hours    MEDICATIONS  (PRN):  acetaminophen     Tablet .. 650 milliGRAM(s) Oral every 6 hours PRN Mild Pain (1 - 3)  ketorolac   Injectable 15 milliGRAM(s) IV Push three times a day PRN Moderate Pain (4 - 6)  ondansetron Injectable 4 milliGRAM(s) IV Push every 8 hours PRN Nausea and/or Vomiting  traMADol 50 milliGRAM(s) Oral three times a day PRN Severe Pain (7 - 10)      Allergies  No Known Allergies      Review of Systems:   Constitutional:  No Fever, No Chills  ENT/Mouth:  No Hearing Changes,  No Difficulty Swallowing  Eyes:  No Eye Pain, No Vision Changes  Cardiovascular:  No Chest Pain, No Palpitations  Respiratory:  No Cough, No Dyspnea  Gastrointestinal:  As described in HPI  Musculoskeletal:  No Joint Swelling, No Back Pain  Skin:  No Skin Lesions, No Jaundice  Neuro:  No Syncope, No Dizziness  Heme/Lymph:  No Bruising, No Bleeding.          Physical Examination:  T(C): 35.7 (12-15-21 @ 07:44), Max: 36.3 (12-14-21 @ 16:21)  HR: 69 (12-15-21 @ 07:44) (67 - 82)  BP: 124/62 (12-15-21 @ 07:44) (124/62 - 164/80)  RR: 18 (12-15-21 @ 07:44) (18 - 18)  SpO2: 95% (12-15-21 @ 07:44) (95% - 98%)        Constitutional: No acute distress.  Eyes:. Conjunctivae are clear, Sclera is non-icteric.  Ears Nose and Throat: The external ears are normal appearing,  Oral mucosa is pink and moist.  Respiratory:  No signs of respiratory distress. Lung sounds are clear bilaterally.  Cardiovascular:  S1 S2, Regular rate and rhythm.  GI: Abdomen is soft, symmetric, and non-tender without distention. There are no visible lesions or scars. Bowel sounds are present and normoactive in all four quadrants. No masses, hepatomegaly, or splenomegaly are noted.   Neuro: No Tremor, No involuntary movements  Skin: No rashes, No Jaundice.          Data:                        13.6   8.86  )-----------( 205      ( 14 Dec 2021 12:15 )             43.4     Hgb Trend:  13.6  12-14-21 @ 12:15      12-14    146  |  108  |  16  ----------------------------<  133<H>  4.3   |  24  |  0.7    Ca    9.3      14 Dec 2021 12:15    TPro  7.0  /  Alb  4.5  /  TBili  0.4  /  DBili  x   /  AST  15  /  ALT  18  /  AlkPhos  99  12-14    Liver panel trend:  TBili 0.4   /   AST 15   /   ALT 18   /   AlkP 99   /   Tptn 7.0   /   Alb 4.5    /   DBili --      12-14              Radiology:  CT Abdomen and Pelvis w/ IV Cont:   ACC: [id number] EXAM:  CT ABDOMEN AND PELVIS IC                          PROCEDURE DATE:  12/14/2021          INTERPRETATION:  REASON FOR EXAM / CLINICAL STATEMENT:  Abdominal pain,   diarrhea, weakness, fever. WBC 8.86  Lipase 146  PMHx of recent UTI, URI    TECHNIQUE:  Contiguous axial CT images were obtained from the lower chest   to the pubic symphysis with IV contrast.  Reformatted images in the   coronal and sagittal planes were acquired.      COMPARISON CT: CT scan of the abdomen and pelvis dated 10/21/2015    OTHER STUDIES USED FOR CORRELATION: None.    FINDINGS:    TUBES AND LINES: None.    LOWER CHEST: The lung bases are clear. No pleural or pericardial effusion.    HEPATIC: The liver is normal in size with no evidence of solid massor   bile duct dilatation. Hepatic steatosis is noted. The portal vein is   patent. The hepatic veins are opacified.    BILIARY: The gallbladder is contracted limiting evaluation.    SPLEEN: Unremarkable.    PANCREAS: Compared with the previous scan of 10/21/2015, the pancreas is   somewhat enlarged measuring 3.1 cm in maximum transverse diameter   (previously 2.6 cm. These findings are compatible with interstitial   edematous pancreatitis, in the appropriate clinical setting. There is no   evidence of peripancreatic inflammatory change or acute peripancreatic   fluid collections. Stable subcentimeter cyst lesion in the region of the   mid body.    ADRENAL GLANDS: Stable 2.4 cm hypodense nodule in the right adrenal   gland, unchanged since 2015, likely adenoma. Left adrenal gland is   unremarkable.    KIDNEYS: There is symmetric renal enhancement. No evidence of   hydronephrosis, calcified stones, or solid mass.    ABDOMINOPELVIC NODES: Unremarkable.    PELVIC ORGANS: No evidence of pelvic mass, lymphadenopathy, or fluid   collection.    BLADDER: Unremarkable.    PERITONEUM/MESENTERY/BOWEL: Scattered colonic diverticula are noted with   no evidence of diverticulitis. No evidence of bowel obstruction, colitis,   inflammatory process, or ascites. No pneumoperitoneum.  The appendix is   normal in appearance.    BONES/SOFT TISSUES: Mild degenerative changes of the spine are noted.   Mild anterolisthesis at L4-5. No evidence of acute fracture.    OTHER: Normal caliber aorta.      IMPRESSION:    1. Compared with the previous scan of 10/21/2015, the pancreas is   somewhat enlarged measuring 3.1 cm in maximum transverse diameter   (previously 2.6 cm. These findings are compatible with interstitial   edematous pancreatitis, in the appropriate clinical setting. There is no   evidence of peripancreatic inflammatory change or acute peripancreatic   fluid collections.    --- End of Report ---            NIKIA MATTHEW MD; Attending Interventional Radiologist  This document has been electronically signed. Dec 14 2021  4:18PM (12-14-21 @ 15:15)    US Abdomen Upper Quadrant Right:   ACC: [id number] EXAM:  US ABDOMEN RT UPR QUADRANT                          PROCEDURE DATE:  12/14/2021          INTERPRETATION:  CLINICAL INFORMATION: Pancreatitis.    COMPARISON: CT scan performed 6 hours prior.    TECHNIQUE: Sonography of the rightupper quadrant.    FINDINGS:    Liver: Within normal limits.  Bile ducts: Normal caliber. Common bile duct measures 3 mm.  Gallbladder: Within normal limits.  Pancreas: Visualized portions are within normal limits.  Right kidney: 11.2 cm. No hydronephrosis.  Ascites: None.  IVC: Visualized portions are within normal limits.    IMPRESSION:    Normal right upper quadrant abdominal ultrasound.        --- End of Report ---            TERRIE COPELAND MD; Attending Interventional Radiologist  This document has been electronically signed. Dec 15 2021  7:16AM (12-14-21 @ 20:59)

## 2021-12-16 LAB
ALBUMIN SERPL ELPH-MCNC: 4.2 G/DL — SIGNIFICANT CHANGE UP (ref 3.5–5.2)
ALP SERPL-CCNC: 91 U/L — SIGNIFICANT CHANGE UP (ref 30–115)
ALT FLD-CCNC: 17 U/L — SIGNIFICANT CHANGE UP (ref 0–41)
ANION GAP SERPL CALC-SCNC: 17 MMOL/L — HIGH (ref 7–14)
AST SERPL-CCNC: 16 U/L — SIGNIFICANT CHANGE UP (ref 0–41)
BASOPHILS # BLD AUTO: 0.03 K/UL — SIGNIFICANT CHANGE UP (ref 0–0.2)
BASOPHILS NFR BLD AUTO: 0.4 % — SIGNIFICANT CHANGE UP (ref 0–1)
BILIRUB SERPL-MCNC: 0.6 MG/DL — SIGNIFICANT CHANGE UP (ref 0.2–1.2)
BUN SERPL-MCNC: 10 MG/DL — SIGNIFICANT CHANGE UP (ref 10–20)
CALCIUM SERPL-MCNC: 9 MG/DL — SIGNIFICANT CHANGE UP (ref 8.5–10.1)
CANCER AG19-9 SERPL-ACNC: 7 U/ML — SIGNIFICANT CHANGE UP
CHLORIDE SERPL-SCNC: 103 MMOL/L — SIGNIFICANT CHANGE UP (ref 98–110)
CO2 SERPL-SCNC: 22 MMOL/L — SIGNIFICANT CHANGE UP (ref 17–32)
CREAT SERPL-MCNC: 0.6 MG/DL — LOW (ref 0.7–1.5)
EOSINOPHIL # BLD AUTO: 0.17 K/UL — SIGNIFICANT CHANGE UP (ref 0–0.7)
EOSINOPHIL NFR BLD AUTO: 2.1 % — SIGNIFICANT CHANGE UP (ref 0–8)
GLUCOSE BLDC GLUCOMTR-MCNC: 182 MG/DL — HIGH (ref 70–99)
GLUCOSE BLDC GLUCOMTR-MCNC: 95 MG/DL — SIGNIFICANT CHANGE UP (ref 70–99)
GLUCOSE SERPL-MCNC: 100 MG/DL — HIGH (ref 70–99)
HCT VFR BLD CALC: 41.1 % — SIGNIFICANT CHANGE UP (ref 37–47)
HGB BLD-MCNC: 13.1 G/DL — SIGNIFICANT CHANGE UP (ref 12–16)
IMM GRANULOCYTES NFR BLD AUTO: 0.4 % — HIGH (ref 0.1–0.3)
LYMPHOCYTES # BLD AUTO: 1.97 K/UL — SIGNIFICANT CHANGE UP (ref 1.2–3.4)
LYMPHOCYTES # BLD AUTO: 24.6 % — SIGNIFICANT CHANGE UP (ref 20.5–51.1)
MAGNESIUM SERPL-MCNC: 1.9 MG/DL — SIGNIFICANT CHANGE UP (ref 1.8–2.4)
MCHC RBC-ENTMCNC: 30.2 PG — SIGNIFICANT CHANGE UP (ref 27–31)
MCHC RBC-ENTMCNC: 31.9 G/DL — LOW (ref 32–37)
MCV RBC AUTO: 94.7 FL — SIGNIFICANT CHANGE UP (ref 81–99)
MONOCYTES # BLD AUTO: 0.53 K/UL — SIGNIFICANT CHANGE UP (ref 0.1–0.6)
MONOCYTES NFR BLD AUTO: 6.6 % — SIGNIFICANT CHANGE UP (ref 1.7–9.3)
NEUTROPHILS # BLD AUTO: 5.28 K/UL — SIGNIFICANT CHANGE UP (ref 1.4–6.5)
NEUTROPHILS NFR BLD AUTO: 65.9 % — SIGNIFICANT CHANGE UP (ref 42.2–75.2)
NRBC # BLD: 0 /100 WBCS — SIGNIFICANT CHANGE UP (ref 0–0)
PLATELET # BLD AUTO: 187 K/UL — SIGNIFICANT CHANGE UP (ref 130–400)
POTASSIUM SERPL-MCNC: 4.2 MMOL/L — SIGNIFICANT CHANGE UP (ref 3.5–5)
POTASSIUM SERPL-SCNC: 4.2 MMOL/L — SIGNIFICANT CHANGE UP (ref 3.5–5)
PROT SERPL-MCNC: 6.6 G/DL — SIGNIFICANT CHANGE UP (ref 6–8)
RBC # BLD: 4.34 M/UL — SIGNIFICANT CHANGE UP (ref 4.2–5.4)
RBC # FLD: 13.2 % — SIGNIFICANT CHANGE UP (ref 11.5–14.5)
SODIUM SERPL-SCNC: 142 MMOL/L — SIGNIFICANT CHANGE UP (ref 135–146)
WBC # BLD: 8.01 K/UL — SIGNIFICANT CHANGE UP (ref 4.8–10.8)
WBC # FLD AUTO: 8.01 K/UL — SIGNIFICANT CHANGE UP (ref 4.8–10.8)

## 2021-12-16 PROCEDURE — 99233 SBSQ HOSP IP/OBS HIGH 50: CPT

## 2021-12-16 PROCEDURE — 99232 SBSQ HOSP IP/OBS MODERATE 35: CPT

## 2021-12-16 RX ORDER — KETOROLAC TROMETHAMINE 30 MG/ML
15 SYRINGE (ML) INJECTION ONCE
Refills: 0 | Status: DISCONTINUED | OUTPATIENT
Start: 2021-12-16 | End: 2021-12-17

## 2021-12-16 RX ORDER — MORPHINE SULFATE 50 MG/1
2 CAPSULE, EXTENDED RELEASE ORAL ONCE
Refills: 0 | Status: DISCONTINUED | OUTPATIENT
Start: 2021-12-16 | End: 2021-12-16

## 2021-12-16 RX ORDER — KETOROLAC TROMETHAMINE 30 MG/ML
30 SYRINGE (ML) INJECTION ONCE
Refills: 0 | Status: DISCONTINUED | OUTPATIENT
Start: 2021-12-16 | End: 2021-12-16

## 2021-12-16 RX ORDER — NICOTINE POLACRILEX 2 MG
1 GUM BUCCAL DAILY
Refills: 0 | Status: DISCONTINUED | OUTPATIENT
Start: 2021-12-16 | End: 2021-12-17

## 2021-12-16 RX ADMIN — PANTOPRAZOLE SODIUM 40 MILLIGRAM(S): 20 TABLET, DELAYED RELEASE ORAL at 08:55

## 2021-12-16 RX ADMIN — MORPHINE SULFATE 2 MILLIGRAM(S): 50 CAPSULE, EXTENDED RELEASE ORAL at 13:40

## 2021-12-16 RX ADMIN — MORPHINE SULFATE 2 MILLIGRAM(S): 50 CAPSULE, EXTENDED RELEASE ORAL at 13:10

## 2021-12-16 RX ADMIN — SODIUM CHLORIDE 250 MILLILITER(S): 9 INJECTION, SOLUTION INTRAVENOUS at 05:02

## 2021-12-16 RX ADMIN — Medication 100 MILLIGRAM(S): at 05:46

## 2021-12-16 RX ADMIN — Medication 2 MILLIGRAM(S): at 22:04

## 2021-12-16 RX ADMIN — MORPHINE SULFATE 2 MILLIGRAM(S): 50 CAPSULE, EXTENDED RELEASE ORAL at 17:58

## 2021-12-16 RX ADMIN — CEFTRIAXONE 100 MILLIGRAM(S): 500 INJECTION, POWDER, FOR SOLUTION INTRAMUSCULAR; INTRAVENOUS at 22:05

## 2021-12-16 RX ADMIN — Medication 100 MILLIGRAM(S): at 15:37

## 2021-12-16 RX ADMIN — Medication 15 MILLIGRAM(S): at 05:03

## 2021-12-16 RX ADMIN — Medication 1 PATCH: at 19:32

## 2021-12-16 RX ADMIN — TRAMADOL HYDROCHLORIDE 50 MILLIGRAM(S): 50 TABLET ORAL at 05:47

## 2021-12-16 RX ADMIN — Medication 100 MILLIGRAM(S): at 21:45

## 2021-12-16 RX ADMIN — Medication 1 PATCH: at 13:01

## 2021-12-16 RX ADMIN — ENOXAPARIN SODIUM 40 MILLIGRAM(S): 100 INJECTION SUBCUTANEOUS at 13:02

## 2021-12-16 RX ADMIN — SODIUM CHLORIDE 250 MILLILITER(S): 9 INJECTION, SOLUTION INTRAVENOUS at 11:31

## 2021-12-16 RX ADMIN — MORPHINE SULFATE 2 MILLIGRAM(S): 50 CAPSULE, EXTENDED RELEASE ORAL at 18:00

## 2021-12-16 NOTE — PROGRESS NOTE ADULT - ATTENDING COMMENTS
patient seen and examined independently on morning rounds for the first time today in ED, chart reviewed and discussed with the medicine resident and on interdisciplinary rounds and agree with the above resident progress note with the following addendum:    tolerated drinking water and apple juice with no abd pain or worsening symptoms- repeatedly saying she wants to eat- also c/o migraine  she is appx 1.5 year post-menopausal with some  vag bleeding and spotting after getting covid vaccine (few months ago)-   denies etoh use or recent travel  reports better appetite over last month but with progressive weakness and difficulty doing things- she is unemployed but states she is usually active     #abdominal pain- pancreatitis with recurrent UTI And abx use (most recently took 5 days of macrobid oral)  -pcp dr. Ugalde  -cont ivf  -npo but advance slowly as tolerates today   -GI consult and eval  -repeat lipase  -check tsh, esr/crp,   -check RUQ US  -UA negative--f/u repeat ucx  -monitor wbc and fever curve and electrolytes  -check stool for cdiff and pcr if develops diarrhea  -tylenol prn for headache    #menopausal bleeding   -GYN f/u as outpatient   -hb stable    #DVT/GI ppx  guarded prognosis    FULL CODE    anticipate dc home within next 24-48 hrs once tolerated advancing diet
52 yo f with pancreatitis.  see above for details

## 2021-12-16 NOTE — ED ADULT NURSE REASSESSMENT NOTE - NS ED NURSE REASSESS COMMENT FT1
PT received from previous RN, no acute signs of distress pt refused m/a and has been educated on risk to fall. Pt is sleeping at bed this time
Pt pending bed assignment to Medicine floor and stool sample not obtained to r/o C. Diff. Pt states "I haven't had diarrhea because I didn't eat or drink all night". Medicine team at bedside and aware of situation.
Pt reports stopping Chantix yesterday and is concerned medication could be an adverse side effect of medication.
patient is npo as per md order, patient refused to listen even with education about her status.
female received from day nurse, awake, alert and oriented x 4, on room air, no s/s of distress.patient out od bed to bathroom without difficuty.

## 2021-12-17 ENCOUNTER — TRANSCRIPTION ENCOUNTER (OUTPATIENT)
Age: 51
End: 2021-12-17

## 2021-12-17 VITALS
TEMPERATURE: 97 F | DIASTOLIC BLOOD PRESSURE: 81 MMHG | HEART RATE: 92 BPM | SYSTOLIC BLOOD PRESSURE: 146 MMHG | RESPIRATION RATE: 18 BRPM

## 2021-12-17 PROCEDURE — 99238 HOSP IP/OBS DSCHRG MGMT 30/<: CPT

## 2021-12-17 RX ORDER — TRAMADOL HYDROCHLORIDE 50 MG/1
1 TABLET ORAL
Qty: 12 | Refills: 0
Start: 2021-12-17 | End: 2021-12-20

## 2021-12-17 RX ORDER — METOCLOPRAMIDE HCL 10 MG
10 TABLET ORAL ONCE
Refills: 0 | Status: COMPLETED | OUTPATIENT
Start: 2021-12-17 | End: 2021-12-17

## 2021-12-17 RX ORDER — IBUPROFEN 200 MG
400 TABLET ORAL EVERY 6 HOURS
Refills: 0 | Status: DISCONTINUED | OUTPATIENT
Start: 2021-12-17 | End: 2021-12-17

## 2021-12-17 RX ORDER — ACETAMINOPHEN 500 MG
2 TABLET ORAL
Qty: 0 | Refills: 0 | DISCHARGE
Start: 2021-12-17

## 2021-12-17 RX ORDER — TRAMADOL HYDROCHLORIDE 50 MG/1
1 TABLET ORAL
Qty: 10 | Refills: 0
Start: 2021-12-17

## 2021-12-17 RX ORDER — ACETAMINOPHEN 500 MG
650 TABLET ORAL EVERY 6 HOURS
Refills: 0 | Status: DISCONTINUED | OUTPATIENT
Start: 2021-12-17 | End: 2021-12-17

## 2021-12-17 RX ORDER — IBUPROFEN 200 MG
1 TABLET ORAL
Qty: 0 | Refills: 0 | DISCHARGE
Start: 2021-12-17

## 2021-12-17 RX ORDER — MORPHINE SULFATE 50 MG/1
2 CAPSULE, EXTENDED RELEASE ORAL ONCE
Refills: 0 | Status: DISCONTINUED | OUTPATIENT
Start: 2021-12-17 | End: 2021-12-17

## 2021-12-17 RX ADMIN — CHLORHEXIDINE GLUCONATE 1 APPLICATION(S): 213 SOLUTION TOPICAL at 06:11

## 2021-12-17 RX ADMIN — Medication 10 MILLIGRAM(S): at 09:08

## 2021-12-17 RX ADMIN — TRAMADOL HYDROCHLORIDE 50 MILLIGRAM(S): 50 TABLET ORAL at 15:53

## 2021-12-17 RX ADMIN — TRAMADOL HYDROCHLORIDE 50 MILLIGRAM(S): 50 TABLET ORAL at 09:08

## 2021-12-17 RX ADMIN — SODIUM CHLORIDE 250 MILLILITER(S): 9 INJECTION, SOLUTION INTRAVENOUS at 06:11

## 2021-12-17 RX ADMIN — Medication 1 PATCH: at 07:02

## 2021-12-17 RX ADMIN — Medication 1 PATCH: at 11:26

## 2021-12-17 RX ADMIN — Medication 100 MILLIGRAM(S): at 06:12

## 2021-12-17 RX ADMIN — Medication 1 PATCH: at 11:25

## 2021-12-17 RX ADMIN — MORPHINE SULFATE 2 MILLIGRAM(S): 50 CAPSULE, EXTENDED RELEASE ORAL at 07:27

## 2021-12-17 RX ADMIN — PANTOPRAZOLE SODIUM 40 MILLIGRAM(S): 20 TABLET, DELAYED RELEASE ORAL at 06:13

## 2021-12-17 NOTE — DISCHARGE NOTE PROVIDER - NSDCMRMEDTOKEN_GEN_ALL_CORE_FT
clonazePAM 2 mg oral tablet: 2 milligram(s) orally once a day (at bedtime)   acetaminophen 325 mg oral tablet: 2 tab(s) orally every 6 hours, As Needed pain  clonazePAM 2 mg oral tablet: 2 milligram(s) orally once a day (at bedtime)  ibuprofen 400 mg oral tablet: 1 tab(s) orally every 6 hours, As Needed pain  traMADol 50 mg oral tablet: 1 tab(s) orally every 8 hours, As Needed -Severe Pain (7 - 10) MDD:150mg

## 2021-12-17 NOTE — DISCHARGE NOTE PROVIDER - HOSPITAL COURSE
Ms. Ko is a 51 year old female patient with PMhx of Recurrent UTIs. 7 Episodes during the last year. Most recent UTI 1 week ago s/p Macrobid 100mg BID (received 5 days/ 7days so far). Follows with a GYN specialist and PCP but no previous cultures per the patient. She presented to the ED on 12/14 for evaluation of abdominal pain and fatigue. History goes back to 3 days PTP when the patient started complaining of LLQ and RLQ abdominal pain. The pain was sharp in nature, of around 7/10 in intensity at its peak, and non radiating. Pain has been occurring intermittently over the last 3 days with no clear exacerbating or relieving factors. No recent trauma. Pain has been associated with watery non bloody non mucoid diarrhea (4 episodes per day for the last 5 days) and dysuria (Chronic per patient, s/p 5 days out of 7 days of Macrobid 100mg BID) but no nausea, vomiting, fever, or chills.    In the ED, the patient was hemodynamically stable, Vitals: /80 mmHg, HR 82bpm, RR 18bpm, SaO2 98% on RA and afebrile. UA was negative. WBC 8.86, Lipase 146. A CT Abdomen and Pelvis was done which showed: pancreas is   somewhat enlarged measuring 3.1 cm in maximum transverse diameter (previously 2.6 cm. These findings are compatible with interstitial edematous pancreatitis.     The patient was admitted to medicine for Interstitial Edematous Pancreatitis. Pt was made NPO, given IV fluids and IV Toradol for pain control. As the pain began to resolve we started to advance her diet which she tolerated well. Pt was also seen by GI who recommends an outpatient MRI and follow up in 1 month.  On hospital day 2 the patient developed a headache which she described as all over her head, throbbing in nature, and associated with pressure behind her eyes. A CT head was preformed and came back negative. On hospital day 3 the headache has resolved with pain control. At this time the patient tolerating PO, is non-tender and does not report any pain. The patient is medically stable for discharge with GI follow up and MRI in one month.

## 2021-12-17 NOTE — PROGRESS NOTE ADULT - REASON FOR ADMISSION
Fatigue and Abdominal Pain

## 2021-12-17 NOTE — DISCHARGE NOTE PROVIDER - PROVIDER TOKENS
PROVIDER:[TOKEN:[[id number]],FOLLOWUP:[1 week]] PROVIDER:[TOKEN:[[id number]],FOLLOWUP:[1-3 days],ESTABLISHEDPATIENT:[T]]

## 2021-12-17 NOTE — DISCHARGE NOTE PROVIDER - NSFOLLOWUPCLINICS_GEN_ALL_ED_FT
[facility]  Outpatient Clinic  [address], [place]   Phone: [phone]  Fax:   Follow Up Time: 2 weeks

## 2021-12-17 NOTE — DISCHARGE NOTE PROVIDER - NSDCFUSCHEDAPPT_GEN_ALL_CORE_FT
JASON DONOHUE ; 12/28/2021 ; [facility] [address] ; 01/25/2022 ; [facility]

## 2021-12-17 NOTE — DISCHARGE NOTE PROVIDER - CARE PROVIDER_API CALL
Kyrie Fraire  [address] [address]  Phone: [phone]  Fax: [phone]  Follow Up Time: 1 week   Kyrie Fraire  [address] [address]  Phone: [phone]  Fax: [phone]  Established Patient  Follow Up Time: 1-3 days

## 2021-12-17 NOTE — DISCHARGE NOTE NURSING/CASE MANAGEMENT/SOCIAL WORK - NSDCPEFALRISK_GEN_ALL_CORE
For information on Fall & Injury Prevention, visit: [email or web address] OR  [email or web address] OR  https://www.cdc.gov/steadi/patient.html

## 2021-12-17 NOTE — DISCHARGE NOTE PROVIDER - NSDCCPCAREPLAN_GEN_ALL_CORE_FT
PRINCIPAL DISCHARGE DIAGNOSIS  Diagnosis: Abdominal pain  Assessment and Plan of Treatment: Your abdominal pain was likely due to interstitial edematous pancreatitis. This is swelling and inflammation of your pancreas. We gave you fluids, pain medication and slowly advanced your diet which allowed your symptoms to improve. You should have a follow up MRI of your abdomen and pelvis in 1 month after your pancreatitis has completely resolved. We also recommend weight loss, a diet low in fat, and to quit tobacco and alcohol. If you develop pain with fever and chills, weight loss, problems digesting food or jaundice please call your doctor or visit the emergency department.       PRINCIPAL DISCHARGE DIAGNOSIS  Diagnosis: Abdominal pain  Assessment and Plan of Treatment: Your abdominal pain was likely due to interstitial edematous pancreatitis. This is swelling and inflammation of your pancreas. We gave you fluids, pain medication and slowly advanced your diet which allowed your symptoms to improve. You should have a follow up MRI of your abdomen and pelvis in 1 month after your pancreatitis has completely resolved. We also recommend weight loss, a diet low in fat, and to quit tobacco and alcohol. If you develop pain with fever and chills, weight loss, problems digesting food or jaundice please call your doctor or visit the emergency department.      SECONDARY DISCHARGE DIAGNOSES  Diagnosis: Recurrent urinary tract infection  Assessment and Plan of Treatment: Please follow up with your gynecologist for evaluation and workup for the urinary tract infections. You completed antibiotic course and urine culture is negative.

## 2021-12-17 NOTE — PROGRESS NOTE ADULT - ASSESSMENT
Case of a 51 year old female patient with history of recurrent UTI's who presented to the ED on 12/14 for evaluation of fatigue and lethargy in the setting of recent abdominal pain, diarrhea, and dysuria, found to have evidence of interstitial edematous pancreatitis on imaging, GI consulted for evaluation.      Interstitial Edematous Pancreatitis* Occasional Alcohol Use (Wine). Used to drink more heavily 15 years ago  * No sepsis on presentation 12/14  * CT Abdomen and Pelvis w/ IV Cont (12.14.21 @ 15:15) Compared with the previous scan of 10/21/2015, the pancreas is  somewhat enlarged measuring 3.1 cm in maximum transverse diameter  (previously 2.6 cm. These findings are compatible with interstitial  edematous pancreatitis, in the appropriate clinical setting.* ED WBC 8.86, Lipase 146    Hemodynamically stable/ No signs of sepsis  BISAP score :0  Work up till now : No  Gall stones, calcium 8.7   TG level 136  Medications reviewed, pt was on chantix for smoking cessation    -Monitor daily HCT/BUN/CR/urine output.  -Monitor pain scale everyday and give pain medications accordingly.  - Monitor Nausea and start IV Zofran 4mg Q8h PRN  - RUQ US : normal  - Trend LFTs   - Lipase 139 12/14  - Alcohol Cessation  - FU IgG4, .  - Advance to low Fat diet and assess tolerance.   - MRI abdomen/pelvis as outpatient in 1 month after resolution of acute pancreatitis.   - FU cdiff PCR, GI PCR if persistent diarrhea.  - will sign off, Recall PRN    Case discussed with Attending Dr Martini
Case of a 51 year old female patient with history of recurrent UTI's who presented to the ED on 12/14 for evaluation of fatigue and lethargy in the setting of recent abdominal pain, diarrhea, and dysuria, found to have evidence of interstitial edematous pancreatitis on imaging, to be admitted for further investigations, management, and monitoring. Currently hemodynamically stable.      Interstitial Edematous Pancreatitis  Less Likely Acute Pancreatitis in Setting of Lack of Inflammatory Changes on Imaging and Lack of Typical Abdominal Pain and The Lipase Level  * Occasional Alcohol Use (Wine). Used to drink more heavily 15 years ago  * No TG level in system  * No US to comment on Roxanne/Choledocholithiasis  * No sepsis on presentation 12/14  * CT Abdomen and Pelvis w/ IV Cont (12.14.21 @ 15:15) Compared with the previous scan of 10/21/2015, the pancreas is  somewhat enlarged measuring 3.1 cm in maximum transverse diameter  (previously 2.6 cm. These findings are compatible with interstitial  edematous pancreatitis, in the appropriate clinical setting. There is no  evidence of peripancreatic inflammatory change or acute peripancreatic  fluid collections.  * ED WBC 8.86, Lipase 146  * S/P 1L LR bolus in ED  * S/P IV Ketorolac 15mg doses for pain control  - Monitor T: afebrile since admission  - Trend WBC no leukocytosis 8.86 12/14  - Seen and evaluated by GI  - Keep patient NPO for now  - Monitor Nausea and start IV Zofran 4mg Q8h PRN  - increase IV fluid hydration to LR at 250mL/hour. S/P 1L LR bolus in ED  - Monitor pain and keep score at 01/10: tylenol 650mg Q6h PRN and IV Morphine 2mg Q6h PRN  - Check blood culture 12/15   - Check LA  - Trend LFTs within normal. Lipase 12/14  - Monitor BM  - Alcohol Cessation  - F/u CA 19-9 and Igg4 Ab    # Watery Non Bloody Non Mucoid Diarrhea   # Rule Out Clostridium Difficile in Setting of Recent 7 UTIs s/p multiple Courses of Antibiotics (Most Recent for the last 5 days)  # Could Be Related to Edema Around Pancreas  * History of Recurrent UTIs. 7 Episodes during the last year. Most recent UTI 1 week ago s/p Macrobid 100mg BID (received 5 days/ 7days so far)  * Follows with a GYN specialist and PCP but no previous cultures per patient  * ED WBC 8.86, Lipase 146  * S/P 1L LR bolus in ED  - Monitor T: afebrile since admission  - Trend WBC no leukocytosis 8.86 12/14  - Monitor BM  - Please send Clostridium Difficile PCR  - Please send GI PCR  - Please send fecal calprotectin  - Please send stool culture (although low suspicion)   - Start IV fluid hydration with LR at 150mL/hour. S/P 1L LR bolus in ED  - Keep patient NPO for now  - Monitor Nausea and start IV Zofran 4mg Q8h PRN  - Will start PO Vancomycin if PCR result comes back positive  - Will consider starting imodium PRN if diarrhea persists with no infectious etiology isolated      # Recent Urinary Tract Infection  # History of Recurrent UTIs  * History of Recurrent UTIs. 7 Episodes during the last year. Most recent UTI 1 week ago s/p Macrobid 100mg BID (received 5 days/ 7days so far)  * Follows with a GYN specialist and PCP but no previous cultures per patient  * ED WBC 8.86, Lipase 146  * Urine Microscopic-Add On (NC) (12.14.21 @ 16:49)    Epithelial Cells: 4 /HPF    Hyaline Casts: 0 /LPF    Bacteria: Negative    Red Blood Cell - Urine: 15 /HPF    White Blood Cell - Urine: 5 /HPF  * Urinalysis (12.14.21 @ 16:49)    pH Urine: 7.0    Glucose Qualitative, Urine: Negative    Blood, Urine: Small    Color: Delia    Urine Appearance: Clear    Bilirubin: Negative    Ketone - Urine: Negative    Specific Gravity: >1.050    Protein, Urine: Trace    Urobilinogen: <2 mg/dL    Nitrite: Negative    Leukocyte Esterase Concentration: Negative  * S/P 1L LR bolus in ED    - Monitor T: afebrile since admission  - Trend WBC no leukocytosis 8.86 12/14  - Follow up urine culture received 12/14  - Check blood culture 12/15  - Start IV Rocephin 1g QD for now since patient was on Macrobid 100mg BID (s/p 5days out of 7 days)   - Start IV fluid hydration with LR at 150mL/hour. S/P 1L LR bolus in ED      Others  - DVT Prophylaxis: Lovenox 40mg Subcutaneously daily  - GI Prophylaxis: Pantoprazole 40mg PO QD   - Diet: NPO for now  - Code Status: Full  
50 y/o woman with h/o recurrent UTI's and anxiety.    1. Interstitial edematous pancreatitis on CT scan   - unclear etiology at this time  - pain now resolved and pt is tolerating diet  - no gallstones, pt drinks alcohol (occasional wine now per GI note)  - TG and Ca WNL  - normal ESR, CRP  - CA 19-9 not elevated  - IgG4 level pending  - GI f/u appreciated: pt will f/u in 1 month for MRI of pancreas to assess for resolution    2. h/o recurrent UTI's  - took 5 days of macrobid prior to admission  - s/p 3 days of ceftriaxone  - urine culture negative  - no need for abx on discharge  - outpt f/u with urogynecology    3. Headaches  - CT scan of head unchanged from 2020  - could be related to nicotine withdrawal  - NSAIDS, tramadol prn   - outpt f/u with PMD    4. Anxiety on klonopin at night      PROGRESS NOTE HANDOFF    Pending: improvement in headache    pt informed of the plan of care    Disposition: home likely today  
a/p:  #abdominal pain- pancreatitis with recurrent UTI And abx use (most recently took 5 days of macrobid oral prior to admission)--also with recent course of chantix for smoking cessation  -pcp dr. Ugalde  -appreciate GI Recommendation--lipase 139--will need outpatient f/u in 1 month for MRI pancreas to evaluate further  -normal TSH, ca 19-9 and CEA, normal esr and crp  -UA negative--f/u repeat ucx  -monitor wbc and fever curve and electrolytes  -no further episodes of diarrhea- no need for cdiff or stool pcr without diarrhea    #headache  -cont tylenol prn---intermittent morphine if symptoms severe  -cthead negative  -could be 2/2 nicotine withdrawl as well as npo status for last 2 days  -outpatient f/u with pcp  -bp control    #menopausal bleeding- prior to admission- no current vaginal bleeding  -GYN f/u as outpatient   -hb stable    #DVT/GI ppx  guarded prognosis    FULL CODE    anticipate dc home in am if tolerates advancing diet and headache resolution

## 2021-12-17 NOTE — PROGRESS NOTE ADULT - SUBJECTIVE AND OBJECTIVE BOX
Patient is a 51y old  Female who presents with a chief complaint of Fatigue and Abdominal Pain (16 Dec 2021 10:30)    HPI    Ms. Ko is a 51 year old female patient known to have:  - Previously Healthy  - History of Recurrent UTIs. 7 Episodes during the last year. Most recent UTI 1 week ago s/p Macrobid 100mg BID (received 5 days/ 7days so far). Follows with a GYN specialist and PCP but no previous cultures per patient      She presented to the ED on 12/14 for evaluation of abdominal pain and fatigue.  History goes back to 3 days PTP when the patient started complaining of LLQ and RLQ abdominal pain.  The pain is sharp in nature, of around 7/10 in intensity at its peak, and non radiating.  Pain has been occurring intermittently over the last 3 days with no clear exacerbating or relieving factors. No recent trauma.  Pain has been associated with watery non bloody non mucoid diarrhea (4 episodes per day for the last 5 days) and dysuria (Chronic per patient, s/p 5 days out of 7 days of Macrobid 100mg BID) but no nausea, vomiting, fever, or chills.      On review of systems, patient reports weight loss of 30 pounds over the last 3 weeks and fatigue over the same period of time (whereby she would spend all day watching television when she usually is very active at baseline). She also reports dry cough, nasal congestion, and intermittent diffuse headaches lasting for few hours since 11/27. She otherwise denies any recent night sweats, other URTI symptoms (sore throat).   No sick contacts.   No recent travel or exposure to recent travelers.      - Patient received 1L LR bolus in ED  - She received IV Ketorolac 15mg doses for pain control  - She will be admitted for further investigations, GI evaluation, and management of interstitial edematous pancreatitis versus UTI.   (14 Dec 2021 19:47)    patient seen and examined independently on morning rounds, chart reviewed and discussed with the medicine resident and on interdisciplinary rounds    still c/o Headache---reviewed CT head performed overnight and negative- tolerating clear liquids and initiating advancing diet---if tolerate plan for likely dc home within next 24-48 hrs- no abdominal pain    Vital Signs Last 24 Hrs  T(C): 36.1 (16 Dec 2021 14:13), Max: 36.1 (16 Dec 2021 07:50)  T(F): 96.9 (16 Dec 2021 14:13), Max: 97 (16 Dec 2021 07:50)  HR: 73 (16 Dec 2021 14:13) (71 - 79)  BP: 149/83 (16 Dec 2021 14:13) (139/66 - 149/83)  BP(mean): --  RR: 19 (16 Dec 2021 14:13) (18 - 19)  SpO2: 97% (16 Dec 2021 07:50) (96% - 97%)             PE:  GEN-NAD, AAOx3  PULM- Clear to auscultation bilaterally, fair air entry  CVS- +s1/s2 RRR no murmurs  GI- soft NT obese ND +bs, no rebound, no guarding  EXT- no edema  SKIN- no rashes/no lesions  NEURO- nonfocal             13.1   8.01  )-----------( 187      ( 16 Dec 2021 07:14 )             41.1     12-16    142  |  103  |  10  ----------------------------<  100<H>  4.2   |  22  |  0.6<L>    Ca    9.0      16 Dec 2021 07:14  Phos  3.7     12-15  Mg     1.9     12-16    TPro  6.6  /  Alb  4.2  /  TBili  0.6  /  DBili  x   /  AST  16  /  ALT  17  /  AlkPhos  91  12-16            Culture - Blood (collected 15 Dec 2021 08:00)  Source: .Blood Blood  Preliminary Report (16 Dec 2021 14:01):    No growth to date.    Culture - Urine (collected 14 Dec 2021 12:42)  Source: Clean Catch Clean Catch (Midstream)  Final Report (15 Dec 2021 18:14):    <10,000 CFU/mL Normal Urogenital Linh        MEDICATIONS  (STANDING):  cefTRIAXone   IVPB 1000 milliGRAM(s) IV Intermittent every 24 hours  chlorhexidine 4% Liquid 1 Application(s) Topical <User Schedule>  clonazePAM  Tablet 2 milliGRAM(s) Oral at bedtime  enoxaparin Injectable 40 milliGRAM(s) SubCutaneous daily  ketorolac   Injectable 15 milliGRAM(s) IV Push once  lactated ringers. 1000 milliLiter(s) (250 mL/Hr) IV Continuous <Continuous>  nicotine -  14 mG/24Hr(s) Patch 1 patch Transdermal daily  pantoprazole    Tablet 40 milliGRAM(s) Oral before breakfast  phenazopyridine 100 milliGRAM(s) Oral every 8 hours  
Discharge note      JASON DONOHUE  51y Female    INTERVAL HPI/OVERNIGHT EVENTS:    Pt ate some yogurt for breakfast  now denies N/V, abdominal pain  still c/o headache: starts occipital and moves forward and behind the eye  denies visual changes  still with vaginal burning - sees a gynecology specialist as outpt    T(F): 97.9 (12-17-21 @ 13:06), Max: 97.9 (12-17-21 @ 13:06)  HR: 72 (12-17-21 @ 13:06) (72 - 80)  BP: 138/67 (12-17-21 @ 13:06) (138/67 - 167/87)  RR: 18 (12-17-21 @ 13:06) (18 - 20)      CAPILLARY BLOOD GLUCOSE      POCT Blood Glucose.: 182 mg/dL (16 Dec 2021 21:23)  POCT Blood Glucose.: 95 mg/dL (16 Dec 2021 16:38)        PHYSICAL EXAM:  GENERAL: NAD  HEAD:  Normocephalic  EYES:  conjunctiva and sclera clear  ENMT: Moist mucous membranes  NERVOUS SYSTEM:  Alert, awake, Good concentration  CHEST/LUNG: CTA b/l  HEART: Regular rate and rhythm  ABDOMEN: Soft, Nontender, Nondistended; Bowel sounds present  EXTREMITIES:   No edema    Consultant(s) Notes Reviewed:  [x ] YES  [ ] NO  Care Discussed with Consultants/Other Providers [ x] YES  [ ] NO    MEDICATIONS  (STANDING):  acetaminophen     Tablet .. 650 milliGRAM(s) Oral every 6 hours  chlorhexidine 4% Liquid 1 Application(s) Topical <User Schedule>  clonazePAM  Tablet 2 milliGRAM(s) Oral at bedtime  enoxaparin Injectable 40 milliGRAM(s) SubCutaneous daily  ibuprofen  Tablet. 400 milliGRAM(s) Oral every 6 hours  nicotine -  14 mG/24Hr(s) Patch 1 patch Transdermal daily  pantoprazole    Tablet 40 milliGRAM(s) Oral before breakfast  phenazopyridine 100 milliGRAM(s) Oral every 8 hours    MEDICATIONS  (PRN):  ondansetron Injectable 4 milliGRAM(s) IV Push every 8 hours PRN Nausea and/or Vomiting  traMADol 50 milliGRAM(s) Oral three times a day PRN Severe Pain (7 - 10)      LABS:                        13.1   8.01  )-----------( 187      ( 16 Dec 2021 07:14 )             41.1     12-16    142  |  103  |  10  ----------------------------<  100<H>  4.2   |  22  |  0.6<L>    Ca    9.0      16 Dec 2021 07:14  Mg     1.9     12-16    TPro  6.6  /  Alb  4.2  /  TBili  0.6  /  DBili  x   /  AST  16  /  ALT  17  /  AlkPhos  91  12-16        Culture - Blood (collected 15 Dec 2021 08:00)  Source: .Blood Blood  Preliminary Report (16 Dec 2021 14:01):    No growth to date.      Lipase, Serum in AM (12.15.21 @ 08:00)   Lipase, Serum: 139 U/L         RADIOLOGY & ADDITIONAL TESTS:    Imaging or report Personally Reviewed:  [x ] YES  [ ] NO    < from: CT Head No Cont (12.15.21 @ 20:26) >  IMPRESSION:    No acute intracranial pathology stable exam since 06/02/2010.      < end of copied text >      < from: US Abdomen Upper Quadrant Right (12.14.21 @ 20:59) >  IMPRESSION:    Normal right upper quadrant abdominal ultrasound.    < end of copied text >      < from: CT Abdomen and Pelvis w/ IV Cont (12.14.21 @ 15:15) >  FINDINGS:    TUBES AND LINES: None.    LOWER CHEST: The lung bases are clear. No pleural or pericardial effusion.    HEPATIC: The liver is normal in size with no evidence of solid massor   bile duct dilatation. Hepatic steatosis is noted. The portal vein is   patent. The hepatic veins are opacified.    BILIARY: The gallbladder is contracted limiting evaluation.    SPLEEN: Unremarkable.    PANCREAS: Compared with the previous scan of 10/21/2015, the pancreas is   somewhat enlarged measuring 3.1 cm in maximum transverse diameter   (previously 2.6 cm. These findings are compatible with interstitial   edematous pancreatitis, in the appropriate clinical setting. There is no   evidence of peripancreatic inflammatory change or acute peripancreatic   fluid collections. Stable subcentimeter cyst lesion in the region of the   mid body.    ADRENAL GLANDS: Stable 2.4 cm hypodense nodule in the right adrenal   gland, unchanged since 2015, likely adenoma. Left adrenal gland is   unremarkable.    KIDNEYS: There is symmetric renal enhancement. No evidence of   hydronephrosis, calcified stones, or solid mass.    ABDOMINOPELVIC NODES: Unremarkable.    PELVIC ORGANS: No evidence of pelvic mass, lymphadenopathy, or fluid   collection.    BLADDER: Unremarkable.    PERITONEUM/MESENTERY/BOWEL: Scattered colonic diverticula are noted with   no evidence of diverticulitis. No evidence of bowel obstruction, colitis,   inflammatory process, or ascites. No pneumoperitoneum.  The appendix is   normal in appearance.    BONES/SOFT TISSUES: Mild degenerative changes of the spine are noted.   Mild anterolisthesis at L4-5. No evidence of acute fracture.    OTHER: Normal caliber aorta.      IMPRESSION:    1. Compared with the previous scan of 10/21/2015, the pancreas is   somewhat enlarged measuring 3.1 cm in maximum transverse diameter   (previously 2.6 cm. These findings are compatible with interstitial   edematous pancreatitis, in the appropriate clinical setting. There is no   evidence of peripancreatic inflammatory change or acute peripancreatic   fluid collections.    --- End of Report ---        < end of copied text >      Case discussed with residents and RN on rounds today    Care discussed with pt      
Gastroenterology progress note:     Patient is a 51y old  Female who presents with a chief complaint of Fatigue and Abdominal Pain (15 Dec 2021 10:50)       Admitted on: 12-14-21    We are following the patient for: pancreatitis     Interval History:     pt is tolerating clear and full liquid diet, stable no abdominal pain.     PAST MEDICAL & SURGICAL HISTORY:  Anxiety        MEDICATIONS  (STANDING):  cefTRIAXone   IVPB 1000 milliGRAM(s) IV Intermittent every 24 hours  chlorhexidine 4% Liquid 1 Application(s) Topical <User Schedule>  clonazePAM  Tablet 2 milliGRAM(s) Oral at bedtime  enoxaparin Injectable 40 milliGRAM(s) SubCutaneous daily  ketorolac   Injectable 15 milliGRAM(s) IV Push once  lactated ringers. 1000 milliLiter(s) (250 mL/Hr) IV Continuous <Continuous>  pantoprazole    Tablet 40 milliGRAM(s) Oral before breakfast  phenazopyridine 100 milliGRAM(s) Oral every 8 hours    MEDICATIONS  (PRN):  acetaminophen     Tablet .. 650 milliGRAM(s) Oral every 6 hours PRN Mild Pain (1 - 3)  ketorolac   Injectable 15 milliGRAM(s) IV Push three times a day PRN Moderate Pain (4 - 6)  ondansetron Injectable 4 milliGRAM(s) IV Push every 8 hours PRN Nausea and/or Vomiting  traMADol 50 milliGRAM(s) Oral three times a day PRN Severe Pain (7 - 10)      Allergies  No Known Allergies      Review of Systems:   Cardiovascular:  No Chest Pain, No Palpitations  Respiratory:  No Cough, No Dyspnea  Gastrointestinal:  As described in HPI    Physical Examination:  T(C): 36.1 (12-16-21 @ 07:50), Max: 36.1 (12-16-21 @ 07:50)  HR: 71 (12-16-21 @ 07:50) (70 - 79)  BP: 140/68 (12-16-21 @ 07:50) (139/66 - 177/83)  RR: 18 (12-16-21 @ 07:50) (18 - 18)  SpO2: 97% (12-16-21 @ 07:50) (96% - 97%)      Constitutional: No acute distress.  Respiratory:  No signs of respiratory distress. Lung sounds are clear bilaterally.  Cardiovascular:  S1 S2, Regular rate and rhythm.  Abdominal: Abdomen is soft, symmetric, and non-tender without distention. There are no visible lesions or scars. Bowel sounds are present and normoactive in all four quadrants. No masses, hepatomegaly, or splenomegaly are noted.   Skin: No rashes, No Jaundice.        Data:                        13.1   8.01  )-----------( 187      ( 16 Dec 2021 07:14 )             41.1     Hgb trend:  13.1  12-16-21 @ 07:14  12.2  12-15-21 @ 08:00  13.6  12-14-21 @ 12:15      12-16    142  |  103  |  10  ----------------------------<  100<H>  4.2   |  22  |  0.6<L>    Ca    9.0      16 Dec 2021 07:14  Phos  3.7     12-15  Mg     1.9     12-16    TPro  6.6  /  Alb  4.2  /  TBili  0.6  /  DBili  x   /  AST  16  /  ALT  17  /  AlkPhos  91  12-16    Liver panel trend:  TBili 0.6   /   AST 16   /   ALT 17   /   AlkP 91   /   Tptn 6.6   /   Alb 4.2    /   DBili --      12-16  TBili 0.3   /   AST 13   /   ALT 15   /   AlkP 90   /   Tptn 6.2   /   Alb 4.0    /   DBili <0.2      12-15  TBili 0.4   /   AST 15   /   ALT 18   /   AlkP 99   /   Tptn 7.0   /   Alb 4.5    /   DBili --      12-14          Culture - Urine (collected 14 Dec 2021 12:42)  Source: Clean Catch Clean Catch (Midstream)  Final Report (15 Dec 2021 18:14):    <10,000 CFU/mL Normal Urogenital Linh         Radiology:    US Abdomen Upper Quadrant Right:   ACC: [id number] EXAM:  US ABDOMEN RT UPR QUADRANT                          PROCEDURE DATE:  12/14/2021          INTERPRETATION:  CLINICAL INFORMATION: Pancreatitis.    COMPARISON: CT scan performed 6 hours prior.    TECHNIQUE: Sonography of the rightupper quadrant.    FINDINGS:    Liver: Within normal limits.  Bile ducts: Normal caliber. Common bile duct measures 3 mm.  Gallbladder: Within normal limits.  Pancreas: Visualized portions are within normal limits.  Right kidney: 11.2 cm. No hydronephrosis.  Ascites: None.  IVC: Visualized portions are within normal limits.    IMPRESSION:    Normal right upper quadrant abdominal ultrasound.        --- End of Report ---            TERRIE COPELAND MD; Attending Interventional Radiologist  This document has been electronically signed. Dec 15 2021  7:16AM (12-14-21 @ 20:59)    
24H events:    Patient is a 51y old Female who presents with a chief complaint of Fatigue and Abdominal Pain (15 Dec 2021 08:17)    Primary diagnosis of Abdominal pain       Today is hospital day 1d. This morning patient was seen and examined at bedside, resting comfortably in bed.    No acute or major events overnight.    PAST MEDICAL & SURGICAL HISTORY  Anxiety      SOCIAL HISTORY:  Social History:  Please refer to above for more details (14 Dec 2021 19:47)      ALLERGIES:  No Known Allergies    MEDICATIONS:  STANDING MEDICATIONS  cefTRIAXone   IVPB 1000 milliGRAM(s) IV Intermittent every 24 hours  chlorhexidine 4% Liquid 1 Application(s) Topical <User Schedule>  clonazePAM  Tablet 2 milliGRAM(s) Oral at bedtime  enoxaparin Injectable 40 milliGRAM(s) SubCutaneous daily  lactated ringers. 1000 milliLiter(s) IV Continuous <Continuous>  pantoprazole    Tablet 40 milliGRAM(s) Oral before breakfast  phenazopyridine 100 milliGRAM(s) Oral every 8 hours    PRN MEDICATIONS  acetaminophen     Tablet .. 650 milliGRAM(s) Oral every 6 hours PRN  ketorolac   Injectable 15 milliGRAM(s) IV Push three times a day PRN  ondansetron Injectable 4 milliGRAM(s) IV Push every 8 hours PRN  traMADol 50 milliGRAM(s) Oral three times a day PRN    VITALS:   T(F): 96.2  HR: 69  BP: 124/62  RR: 18  SpO2: 95%    PHYSICAL EXAM:  GENERAL: NAD, well-groomed, well-developed  HEAD:  Atraumatic, Normocephalic  EYES: EOMI  NECK: Supple  NERVOUS SYSTEM:  Alert & Oriented X3, non focal   CHEST/LUNG: Clear to auscultation bilaterally; No rales, rhonchi, wheezing, or rubs  HEART: Regular rate and rhythm; No murmurs, rubs, or gallops  ABDOMEN: Soft, Nontender, Nondistended; Bowel sounds present  EXTREMITIES:  2+ Peripheral Pulses, No clubbing, cyanosis, or edema  LYMPH: No lymphadenopathy noted  SKIN: No rashes or lesions  LABS:                        12.2   7.99  )-----------( 191      ( 15 Dec 2021 08:00 )             39.1     12-15    143  |  107  |  19  ----------------------------<  107<H>  4.0   |  24  |  0.6<L>    Ca    8.7      15 Dec 2021 08:00  Phos  3.7     12-15  Mg     2.0     12-15    TPro  6.2  /  Alb  4.0  /  TBili  0.3  /  DBili  <0.2  /  AST  13  /  ALT  15  /  AlkPhos  90  12-15      Urinalysis Basic - ( 14 Dec 2021 16:49 )    Color: Delia / Appearance: Clear / SG: >1.050 / pH: x  Gluc: x / Ketone: Negative  / Bili: Negative / Urobili: <2 mg/dL   Blood: x / Protein: Trace / Nitrite: Negative   Leuk Esterase: Negative / RBC: 15 /HPF / WBC 5 /HPF   Sq Epi: x / Non Sq Epi: 4 /HPF / Bacteria: Negative        Lactate, Blood: 0.8 mmol/L (12-15-21 @ 08:00)          RADIOLOGY:

## 2021-12-17 NOTE — DISCHARGE NOTE NURSING/CASE MANAGEMENT/SOCIAL WORK - PATIENT PORTAL LINK FT
You can access the FollowMyHealth Patient Portal offered by [facility] by registering at the following website: [email or web address]. By joining [facility]’s FollowMyHealth portal, you will also be able to view your health information using other applications (apps) compatible with our system.

## 2021-12-18 LAB — IGG4 SER-MCNC: 18 MG/DL — SIGNIFICANT CHANGE UP (ref 2–96)

## 2021-12-20 LAB
CULTURE RESULTS: SIGNIFICANT CHANGE UP
SPECIMEN SOURCE: SIGNIFICANT CHANGE UP

## 2021-12-23 DIAGNOSIS — R73.9 HYPERGLYCEMIA, UNSPECIFIED: ICD-10-CM

## 2021-12-23 DIAGNOSIS — N39.0 URINARY TRACT INFECTION, SITE NOT SPECIFIED: ICD-10-CM

## 2021-12-23 DIAGNOSIS — R51.9 HEADACHE, UNSPECIFIED: ICD-10-CM

## 2021-12-23 DIAGNOSIS — K76.0 FATTY (CHANGE OF) LIVER, NOT ELSEWHERE CLASSIFIED: ICD-10-CM

## 2021-12-23 DIAGNOSIS — F41.9 ANXIETY DISORDER, UNSPECIFIED: ICD-10-CM

## 2021-12-23 DIAGNOSIS — N92.4 EXCESSIVE BLEEDING IN THE PREMENOPAUSAL PERIOD: ICD-10-CM

## 2021-12-23 DIAGNOSIS — K86.1 OTHER CHRONIC PANCREATITIS: ICD-10-CM

## 2021-12-23 DIAGNOSIS — R10.84 GENERALIZED ABDOMINAL PAIN: ICD-10-CM

## 2021-12-23 DIAGNOSIS — Z87.440 PERSONAL HISTORY OF URINARY (TRACT) INFECTIONS: ICD-10-CM

## 2021-12-23 DIAGNOSIS — E66.9 OBESITY, UNSPECIFIED: ICD-10-CM

## 2021-12-28 ENCOUNTER — APPOINTMENT (OUTPATIENT)
Dept: UROGYNECOLOGY | Facility: CLINIC | Age: 51
End: 2021-12-28
Payer: COMMERCIAL

## 2021-12-28 VITALS
WEIGHT: 222 LBS | HEART RATE: 87 BPM | SYSTOLIC BLOOD PRESSURE: 135 MMHG | DIASTOLIC BLOOD PRESSURE: 84 MMHG | HEIGHT: 61 IN | BODY MASS INDEX: 41.91 KG/M2

## 2021-12-28 PROBLEM — F41.9 ANXIETY DISORDER, UNSPECIFIED: Chronic | Status: ACTIVE | Noted: 2021-12-14

## 2021-12-28 PROCEDURE — 99215 OFFICE O/P EST HI 40 MIN: CPT

## 2021-12-28 NOTE — COUNSELING
[FreeTextEntry1] : Please return in about 2 months or as needed.\par \par Please stop using Uribel for the next 2 weeks and see if your symptoms improve. If not, you can resume using Uribel as needed.\par \par Please continue to use vaginal estrogen cream.\par \par For Urgency, Frequency and Urge related incontinence.\par \par Please decrease or stop the use of the following:\par \par 1. Coffee (Caffeinated and decaffeinated)\par \par 2. Teas (Caffeinated, decaffeinated, Ice tea, and green teas\par \par 3. All sodas (Caffeinated, decaffeinated, energy drinks)\par \par 4. All carbonated drinks including seltzer water\par \par 5. All citric fruit juices\par \par 6. Water with lemon or lime\par \par 7. Spicy foods\par \par 8. Tomato Sauce based foods\par \par 9. Chocolate and chocolate containing products\par \par 10. All alcohol\par

## 2021-12-28 NOTE — ASSESSMENT
[FreeTextEntry1] : Bladder pain -\par Patient states this is greatly improved with the use of Uribel PRN. We did discuss more common side effects of Uribel use, including tachycardia, dizziness, N/V. Muscle aches and fatigue are not commonly reported side effects of the medication. However, given these relatively new symptoms for the patient and the fact that she began to experience them right around the time when she started taking Uribel, I discussed with her that it is best she discontinue Uribel use for the next few weeks to see if her symptoms resolve. She can use Azo in the meantime. \par She is also currently undergoing a workup for what could be pancreatitis. We will hold off on prescribing new treatment plans for her while she's undergoing this workup.\par We once again discussed the importance of abiding by the bladder diet and the effect of smoking on her symptoms. She expressed understanding.\par She will follow up in about 2 months in order to reevaluate her symptoms and once her other medical issues are under control.\par \par Vaginal atrophy -\par She will continue 3 time weekly vaginal estrogen cream use.\par \par Stress urinary incontinence (SYLVIA) -\par Discussed etiology of this condition with the patient and possible treatment options. Discussed with her that weight loss can greatly alleviate her current symptoms. We will reevaluate her SYLVIA symptoms after she undergoes bariatric surgery.\par

## 2021-12-28 NOTE — HISTORY OF PRESENT ILLNESS
[FreeTextEntry1] : 50 year with vaginal atrophy, SYLVIA, nocturia.\par She is very happy to report that her vaginal burning is much better with the Uribel, however since she started taking the Uribel she has been experiencing fatigue, headaches, and muscle aches.\par \par She was recently in the [facility] ED due to muscle pain and was found to have possible pancreatitis. She is currently undergoing workup for this.\par  \par Voids 4-7/ day, 2 /night. improved urgency, 2-3 SYLVIA/week, 0 UUI/week, no UTI's in past 6 months, does not follow bladder diet.\par 7 BM/week. no FI, no fiber, no Miralax \par Uses vaginal estrogen 3x/week. no vaginal bleeding, no vaginal/pelvic pain.\par  \par Of note patient is preparing for a sleeve gastrectomy. She is currently smoking 2 cigarettes/day and is in the process of obtaining a nicotine patch. She is also waiting for the new [employer] machine to arrive to her house.\par \par \par From initial visit:\par 50 year para 2 (NSVDx2)  presents with complaints of vaginal burning for several years, worsening over the last 2 years. Symptoms are worsened by urinary urgency, relieved by azo . Recently finished course of Macrobid two weeks ago for suspected UTI (not culture-proven), did not relieve symptoms. Was also prescribed boric acid on 9/16, has not yet experienced relief of symptoms. \par \par Pelvic organ prolapse: no bulge, no pressure/heaviness\par \par Stress urinary incontinence: 2x/week no prior incontinence procedures\par \par Overactive bladder syndrome: daily frequency 5-6x/day, 1-2x/night,  + urgency,  0x/week UUI episodes,    \par 4-5 pads/day     Bladder irritants include coffee, soda, seltzer.    Prior OAB meds: no\par \par Voiding dysfunction: denies Incomplete bladder emptying, denies hesitancy\par \par Lower urinary tract/vaginal symptoms: 1-2 UTIs per year, no hematuria, no dysuria, no bladder pain\par \par 7 BM/week   no constipation   Fecal incontinence no\par \par Sexually active: yes  Dyspareunia: no   Pelvic pain: no Vaginal dryness: yes   LMP: 3 months ago   \par

## 2022-02-04 ENCOUNTER — APPOINTMENT (OUTPATIENT)
Age: 52
End: 2022-02-04
Payer: COMMERCIAL

## 2022-02-04 VITALS
SYSTOLIC BLOOD PRESSURE: 126 MMHG | DIASTOLIC BLOOD PRESSURE: 86 MMHG | BODY MASS INDEX: 40.97 KG/M2 | HEIGHT: 61 IN | HEART RATE: 85 BPM | OXYGEN SATURATION: 97 % | RESPIRATION RATE: 14 BRPM | WEIGHT: 217 LBS

## 2022-02-04 DIAGNOSIS — G47.33 OBSTRUCTIVE SLEEP APNEA (ADULT) (PEDIATRIC): ICD-10-CM

## 2022-02-04 DIAGNOSIS — R91.1 SOLITARY PULMONARY NODULE: ICD-10-CM

## 2022-02-04 PROCEDURE — 99213 OFFICE O/P EST LOW 20 MIN: CPT

## 2022-02-07 ENCOUNTER — NON-APPOINTMENT (OUTPATIENT)
Age: 52
End: 2022-02-07

## 2022-03-01 ENCOUNTER — APPOINTMENT (OUTPATIENT)
Dept: UROGYNECOLOGY | Facility: CLINIC | Age: 52
End: 2022-03-01

## 2022-03-08 ENCOUNTER — APPOINTMENT (OUTPATIENT)
Dept: UROGYNECOLOGY | Facility: CLINIC | Age: 52
End: 2022-03-08
Payer: COMMERCIAL

## 2022-03-08 VITALS
DIASTOLIC BLOOD PRESSURE: 85 MMHG | HEIGHT: 61 IN | BODY MASS INDEX: 40.97 KG/M2 | HEART RATE: 91 BPM | SYSTOLIC BLOOD PRESSURE: 121 MMHG | WEIGHT: 217 LBS

## 2022-03-08 DIAGNOSIS — K62.9 DISEASE OF ANUS AND RECTUM, UNSPECIFIED: ICD-10-CM

## 2022-03-08 DIAGNOSIS — R39.89 OTHER SYMPTOMS AND SIGNS INVOLVING THE GENITOURINARY SYSTEM: ICD-10-CM

## 2022-03-08 PROCEDURE — 99214 OFFICE O/P EST MOD 30 MIN: CPT | Mod: 25

## 2022-03-08 PROCEDURE — 51701 INSERT BLADDER CATHETER: CPT

## 2022-03-08 NOTE — PHYSICAL EXAM
[Chaperone Present] : A chaperone was present in the examining room during all aspects of the physical examination [No Acute Distress] : in no acute distress [Well developed] : well developed [Well Nourished] : ~L well nourished [FreeTextEntry1] : Urethra was prepped in sterile fashion and then a sterile catheter was used by me to drain the bladder.\par Void: 150 cc\par PVR: 10 cc\par \par No atrophy noted on speculum exam\par Inclusion cysts on labia\par \par small raised fleshy, non tender lesions around the anus\par \par

## 2022-03-08 NOTE — END OF VISIT
[Time Spent: ___ minutes] : I have spent [unfilled] minutes of time on the encounter. [FreeTextEntry3] : Agree with above note.

## 2022-03-08 NOTE — DISCUSSION/SUMMARY
[FreeTextEntry1] : Bladder pain: \par Urine culture obtained.\par Will follow up. Patient was started on Macrobid 100 mg BID x 5 days. \par Pyridium 200 mg TID x 3 days\par Patient was given a list of bladder irritants and would like to try diet changes to alleviate her pain. \par Follow up will be based on urine results. \par If urine culture is negative, may consider intravesical bladder instillations or oral medication (such as Elavil or gabapentin). \par \par \par Atrophic Vaginitis:\par Advised to continue to apply a pea size amount of the cream to the opening of the vagina three nights per week.\par \par Perianal lesions:\par Referral to colorectal surgeon given. \par

## 2022-03-08 NOTE — HISTORY OF PRESENT ILLNESS
[FreeTextEntry1] : Patient is here for 2 months med check for bladder pain, stress incontinence, and vaginal atrophy being treated with Uribel. She states that Uribel helped her symptoms, however she has not been taking it for about a month.\par \par Last seen on 12/28/2021 for med check.\par \par Of note patient is preparing for a sleeve gastrectomy. She is in the process of obtaining a nicotine patch.\par She recently starting going to the gym and is following their diet. \par \par She states that the workup showed that she does not have pancreatitis.\par She had diarrhea and a rash while taking the Uribel. \par \par She was on vacation recently and stopped taking the Uribel because she lost it. She has been experiencing vaginal burning. She noted that last night the burning was really uncomfortable and she took Azo with little relief. \par She has been using estrogen cream three times per week. \par \par \par From initial visit:\par 50 year para 2 (NSVDx2) presents with complaints of vaginal burning for several years, worsening over the last 2 years. Symptoms are worsened by urinary urgency, relieved by azo . Recently finished course of Macrobid two weeks ago for suspected UTI (not culture-proven), did not relieve symptoms. Was also prescribed boric acid on 9/16, has not yet experienced relief of symptoms. \par \par Pelvic organ prolapse: no bulge, no pressure/heaviness\par \par Stress urinary incontinence: 2x/week no prior incontinence procedures\par \par Overactive bladder syndrome: daily frequency 5-6x/day, 1-2x/night, + urgency, 0x/week UUI episodes, \par 4-5 pads/day Bladder irritants include coffee, soda, seltzer. Prior OAB meds: no\par \par Voiding dysfunction: denies Incomplete bladder emptying, denies hesitancy\par \par Lower urinary tract/vaginal symptoms: 1-2 UTIs per year, no hematuria, no dysuria, no bladder pain\par \par 7 BM/week no constipation Fecal incontinence no\par \par Sexually active: yes Dyspareunia: no Pelvic pain: no Vaginal dryness: yes LMP: 3 months ago \par

## 2022-03-08 NOTE — COUNSELING
[FreeTextEntry1] : We will contact you once the urine results are available.\par \par Please take Macrobid (antibiotic) twice a day for 5 days.\par \par A prescription for Pyridium was sent to your pharmacy. You can take it 3 times a day, as needed, for pain. \par \par Please continue to use vaginal estrogen cream. \par \par For Urgency, Frequency and Urge related incontinence.\par \par Please decrease or stop the use of the following:\par \par 1. Coffee (Caffeinated and decaffeinated)\par \par 2. Teas (Caffeinated, decaffeinated, Ice tea, and green teas\par \par 3. All sodas (Caffeinated, decaffeinated, energy drinks)\par \par 4. All carbonated drinks including seltzer water\par \par 5. All citric fruit juices\par \par 6. Water with lemon or lime\par \par 7. Spicy foods\par \par 8. Tomato Sauce based foods\par \par 9. Chocolate and chocolate containing products\par \par 10. All alcohol\par \par Follow up will be based on the urine results.

## 2022-03-11 LAB — BACTERIA UR CULT: ABNORMAL

## 2022-03-11 RX ORDER — NITROFURANTOIN (MONOHYDRATE/MACROCRYSTALS) 25; 75 MG/1; MG/1
100 CAPSULE ORAL
Qty: 10 | Refills: 0 | Status: DISCONTINUED | COMMUNITY
Start: 2022-03-11 | End: 2022-03-11

## 2022-03-13 NOTE — ED PROVIDER NOTE - MDM PATIENT STATEMENT FOR ADDL TREATMENT
History  Chief Complaint   Patient presents with    Constipation     states constipation off and on; just completed treatment for UTI; states last BM 2 days ago- but passed a small hard amount-has been cramping and uncomfortable; noted some blood when wiped last night; painful in rectum area; continues with urinary symptoms  Pt with 1-2 days of pelvic cramping  Pt with rectal pain and constipation , pt sees blood with wiping , pt also with burning with urination x 3 days given bactrim for same,  Not helping at all       Abdominal Cramping  Pain location:  Suprapubic  Pain quality: aching    Pain radiates to:  Does not radiate  Pain severity:  Mild  Onset quality:  Gradual  Duration:  2 days  Timing:  Intermittent  Progression:  Waxing and waning  Chronicity:  New  Context: not alcohol use    Relieved by:  Nothing  Worsened by:  Nothing  Ineffective treatments:  None tried  Associated symptoms: no anorexia    Risk factors: no alcohol abuse        Prior to Admission Medications   Prescriptions Last Dose Informant Patient Reported? Taking?    Multiple Vitamins-Minerals (HAIR SKIN AND NAILS FORMULA PO)  Self Yes No   Sig: Take by mouth   Stelara 45 MG/0 5ML injection  Self Yes No   acetaminophen (TYLENOL) 500 mg tablet  Self No No   Sig: Take 1 tablet (500 mg total) by mouth every 6 (six) hours as needed (pain)   docusate sodium (COLACE) 100 mg capsule  Self No No   Sig: Take 1 capsule (100 mg total) by mouth in the morning   ibuprofen (MOTRIN) 400 mg tablet  Self No No   Sig: Take 1 tablet (400 mg total) by mouth every 6 (six) hours as needed for moderate pain   metFORMIN (GLUCOPHAGE-XR) 500 mg 24 hr tablet  Self No No   Sig: Take 1 tablet (500 mg total) by mouth daily with dinner   phenazopyridine (PYRIDIUM) 200 mg tablet  Self No No   Sig: Take 1 tablet (200 mg total) by mouth 3 (three) times a day with meals   phentermine (ADIPEX-P) 37 5 MG tablet  Self Yes No   Sig: Take 37 5 mg by mouth every morning before breakfast Patient said she is taking 1/2 tablet right now   sulfamethoxazole-trimethoprim (BACTRIM DS) 800-160 mg per tablet   No No   Sig: Take 1 tablet by mouth 2 (two) times a day for 3 days   tolterodine (DETROL) 1 mg tablet  Self No No   Sig: Take 1 tablet (1 mg total) by mouth 2 (two) times a day   topiramate (TOPAMAX) 25 mg tablet  Self Yes No      Facility-Administered Medications: None       Past Medical History:   Diagnosis Date    [facility]     Fever 12/8/2021    Hidradenitis suppurativa     thighs    Obesity, Class II, BMI 35-39 9     TMJ (temporomandibular joint disorder)     last assessed 02/24/2016    Urinary tract infection     Varicella     vaccine       Past Surgical History:   Procedure Laterality Date    COLONOSCOPY      x several; last one 2/2020    WISDOM TOOTH EXTRACTION         Family History   Problem Relation Age of Onset    Diabetes Mother         Due to underlying condition with foot ulcer  Type 2 DM without complication   Diabetes Father     Polycystic ovary syndrome Sister     Stroke Neg Hx     Heart attack Neg Hx     Hypertension Neg Hx     Breast cancer Neg Hx     Colon cancer Neg Hx     Ovarian cancer Neg Hx     Uterine cancer Neg Hx      I have reviewed and agree with the history as documented  E-Cigarette/Vaping    E-Cigarette Use Never User      E-Cigarette/Vaping Substances    Nicotine No     THC No     CBD No     Flavoring No     Other No     Unknown No      Social History     Tobacco Use    Smoking status: Former Smoker    Smokeless tobacco: Never Used    Tobacco comment: hooka smoke   Vaping Use    Vaping Use: Never used   Substance Use Topics    Alcohol use: No    Drug use: No       Review of Systems   Constitutional: Negative  HENT: Negative  Eyes: Negative  Respiratory: Negative  Cardiovascular: Negative  Gastrointestinal: Negative  Negative for anorexia  Endocrine: Negative      Genitourinary: Positive for pelvic pain  Musculoskeletal: Negative  Skin: Negative  Allergic/Immunologic: Negative  Neurological: Negative  Negative for facial asymmetry  Hematological: Negative  Psychiatric/Behavioral: Negative  All other systems reviewed and are negative  Physical Exam  Physical Exam  Vitals and nursing note reviewed  Constitutional:       Appearance: Normal appearance  She is normal weight  HENT:      Head: Normocephalic and atraumatic  Right Ear: Tympanic membrane, ear canal and external ear normal       Left Ear: Tympanic membrane, ear canal and external ear normal       Nose: Nose normal       Mouth/Throat:      Mouth: Mucous membranes are moist       Pharynx: Oropharynx is clear  Eyes:      Extraocular Movements: Extraocular movements intact  Conjunctiva/sclera: Conjunctivae normal       Pupils: Pupils are equal, round, and reactive to light  Cardiovascular:      Rate and Rhythm: Normal rate and regular rhythm  Pulses: Normal pulses  Heart sounds: Normal heart sounds  Pulmonary:      Effort: Pulmonary effort is normal       Breath sounds: Normal breath sounds  Abdominal:      General: Abdomen is flat  Bowel sounds are normal       Palpations: Abdomen is soft  Genitourinary:     Rectum: Guaiac result positive  Comments: External rectum wnl   No hemorrhoid  Digital exam partilly allowed  Very Small fissure palp with tenderness no internal hemorroid palp quiac negative   Musculoskeletal:         General: Normal range of motion  Cervical back: Normal range of motion and neck supple  Skin:     General: Skin is warm  Capillary Refill: Capillary refill takes less than 2 seconds  Neurological:      General: No focal deficit present  Mental Status: She is alert and oriented to person, place, and time     Psychiatric:         Mood and Affect: Mood normal          Behavior: Behavior normal          Vital Signs  ED Triage Vitals [03/13/22 1122] Temperature Pulse Respirations Blood Pressure SpO2   98 3 °F (36 8 °C) (!) 111 16 141/80 100 %      Temp Source Heart Rate Source Patient Position - Orthostatic VS BP Location FiO2 (%)   Oral Monitor Sitting Left arm --      Pain Score       9           Vitals:    03/13/22 1122 03/13/22 1441   BP: 141/80 124/77   Pulse: (!) 111 99   Patient Position - Orthostatic VS: Sitting Sitting         Visual Acuity      ED Medications  Medications   iohexol (OMNIPAQUE) 240 MG/ML solution 50 mL (50 mL Oral Given 3/13/22 1215)   iohexol (OMNIPAQUE) 350 MG/ML injection (SINGLE-DOSE) 100 mL (100 mL Intravenous Given 3/13/22 1411)   glycerin (adult) rectal suppository 1 suppository (1 suppository Rectal Given 3/13/22 1439)       Diagnostic Studies  Results Reviewed     Procedure Component Value Units Date/Time    Lipase [[id number]]  (Normal) Collected: 03/13/22 1215    Lab Status: Final result Specimen: Blood from Arm, Right Updated: 03/13/22 1230     Lipase 84 u/L     Comprehensive metabolic panel [[id number]]  (Abnormal) Collected: 03/13/22 1215    Lab Status: Final result Specimen: Blood from Arm, Right Updated: 03/13/22 1230     Sodium 138 mmol/L      Potassium 4 2 mmol/L      Chloride 103 mmol/L      CO2 28 mmol/L      ANION GAP 7 mmol/L      BUN 15 mg/dL      Creatinine 1 06 mg/dL      Glucose 112 mg/dL      Calcium 9 4 mg/dL      AST 24 U/L      ALT 36 U/L      Alkaline Phosphatase 81 U/L      Total Protein 8 9 g/dL      Albumin 4 7 g/dL      Total Bilirubin 1 06 mg/dL      eGFR 74 ml/min/1 73sq m     Narrative:      [place] guidelines for Chronic Kidney Disease (CKD):     Stage 1 with normal or high GFR (GFR > 90 mL/min/1 73 square meters)    Stage 2 Mild CKD (GFR = 60-89 mL/min/1 73 square meters)    Stage 3A Moderate CKD (GFR = 45-59 mL/min/1 73 square meters)    Stage 3B Moderate CKD (GFR = 30-44 mL/min/1 73 square meters)    Stage 4 Severe CKD (GFR = 15-29 mL/min/1 73 square meters)   Stage 5 End Stage CKD (GFR <15 mL/min/1 73 square meters)  Note: GFR calculation is accurate only with a steady state creatinine    CBC and differential [[id number]]  (Abnormal) Collected: 03/13/22 1215    Lab Status: Final result Specimen: Blood from Arm, Right Updated: 03/13/22 1224     WBC 10 40 Thousand/uL      RBC 4 92 Million/uL      Hemoglobin 14 7 g/dL      Hematocrit 42 7 %      MCV 87 fL      MCH 29 9 pg      MCHC 34 5 g/dL      RDW 13 3 %      MPV 8 4 fL      Platelets 552 Thousands/uL      Neutrophils Relative 73 %      Lymphocytes Relative 17 %      Monocytes Relative 7 %      Eosinophils Relative 2 %      Basophils Relative 1 %      Neutrophils Absolute 7 60 Thousands/µL      Lymphocytes Absolute 1 80 Thousands/µL      Monocytes Absolute 0 80 Thousand/µL      Eosinophils Absolute 0 20 Thousand/µL      Basophils Absolute 0 10 Thousands/µL     Urine Microscopic [[id number]]  (Normal) Collected: 03/13/22 1140    Lab Status: Final result Specimen: Urine, Other Updated: 03/13/22 1201     RBC, UA 0-1 /hpf      WBC, UA 0-1 /hpf      Epithelial Cells Occasional /hpf      Bacteria, UA Occasional /hpf     UA w Reflex to Microscopic w Reflex to Culture [[id number]]  (Abnormal) Collected: 03/13/22 1140    Lab Status: Final result Specimen: Urine, Other Updated: 03/13/22 1153     Color, UA Angelica     Clarity, UA Clear     Specific Gravity, UA 1 010     pH, UA 7 0     Leukocytes, UA 25 0     Nitrite, UA Negative     Protein, UA Negative mg/dl      Glucose, UA Negative mg/dl      Ketones, UA Negative mg/dl      Bilirubin, UA Negative     Blood, UA 10 0     UROBILINOGEN UA Negative mg/dL     POCT pregnancy, urine [[id number]]  (Normal) Resulted: 03/13/22 1139    Lab Status: Final result Updated: 03/13/22 1139     EXT PREG TEST UR (Ref: Negative) negative     Control valid                 CT abdomen pelvis with contrast   Final Result by Ann Reyna MD (03/13 1421)      Stool-filled colon    Possible rectal impaction  Correlate with bowel history  Workstation performed: [id number]                    Procedures  Procedures         ED Course                               SBIRT 20yo+      Most Recent Value   SBIRT (25 yo +)    In order to provide better care to our patients, we are screening all of our patients for alcohol and drug use  Would it be okay to ask you these screening questions? Yes Filed at: 03/13/2022 1130   Initial Alcohol Screen: US AUDIT-C     1  How often do you have a drink containing alcohol? 0 Filed at: 03/13/2022 1130   2  How many drinks containing alcohol do you have on a typical day you are drinking? 0 Filed at: 03/13/2022 1130   3a  Male UNDER 65: How often do you have five or more drinks on one occasion? 0 Filed at: 03/13/2022 1130   3b  FEMALE Any Age, or MALE 65+: How often do you have 4 or more drinks on one occassion? 0 Filed at: 03/13/2022 1130   Audit-C Score 0 Filed at: 03/13/2022 1130   MENDEZ: How many times in the past year have you    Used an illegal drug or used a prescription medication for non-medical reasons? Never Filed at: 03/13/2022 1130                    MDM    Disposition  Final diagnoses:   Constipation, unspecified constipation type   Pain in pelvis     Time reflects when diagnosis was documented in both MDM as applicable and the Disposition within this note     Time User Action Codes Description Comment    3/13/2022  2:33 PM Svetlana Corner  Add [K59 00] Constipation, unspecified constipation type     3/13/2022  5:26 PM Svetlana Corner  Add [R10 2] Pain in pelvis       ED Disposition     ED Disposition Condition Date/Time Comment    Discharge Stable Sun Mar 13, 2022  2:32 PM Smiley Barragan discharge to home/self care              Follow-up Information     Follow up With Specialties Details Why [address], [address]  [phone]            Discharge Medication List as of 3/13/2022  2:33 PM START taking these medications    Details   Glycerin, Laxative, (Glycerin, Adult,) 2 1 g SUPP Insert 1 suppository into the rectum in the morning, Starting Sun 3/13/2022, Print      polyethylene glycol (MIRALAX) 17 g packet Take 17 g by mouth daily, Starting Sun 3/13/2022, Print         CONTINUE these medications which have NOT CHANGED    Details   acetaminophen (TYLENOL) 500 mg tablet Take 1 tablet (500 mg total) by mouth every 6 (six) hours as needed (pain), Starting Sat 1/1/2022, Normal      docusate sodium (COLACE) 100 mg capsule Take 1 capsule (100 mg total) by mouth in the morning, Starting Fri 2/4/2022, Normal      ibuprofen (MOTRIN) 400 mg tablet Take 1 tablet (400 mg total) by mouth every 6 (six) hours as needed for moderate pain, Starting Sat 1/1/2022, Normal      metFORMIN (GLUCOPHAGE-XR) 500 mg 24 hr tablet Take 1 tablet (500 mg total) by mouth daily with dinner, Starting Fri 2/4/2022, Normal      Multiple Vitamins-Minerals (HAIR SKIN AND NAILS FORMULA PO) Take by mouth, Historical Med      phenazopyridine (PYRIDIUM) 200 mg tablet Take 1 tablet (200 mg total) by mouth 3 (three) times a day with meals, Starting Wed 12/29/2021, Normal      phentermine (ADIPEX-P) 37 5 MG tablet Take 37 5 mg by mouth every morning before breakfast Patient said she is taking 1/2 tablet right now, Historical Med      Stelara 45 MG/0 5ML injection Starting Wed 1/12/2022, Historical Med      sulfamethoxazole-trimethoprim (BACTRIM DS) 800-160 mg per tablet Take 1 tablet by mouth 2 (two) times a day for 3 days, Starting Thu 3/10/2022, Until Sun 3/13/2022, Normal      tolterodine (DETROL) 1 mg tablet Take 1 tablet (1 mg total) by mouth 2 (two) times a day, Starting Thu 1/27/2022, Normal      topiramate (TOPAMAX) 25 mg tablet Starting Mon 11/15/2021, Historical Med             No discharge procedures on file      PDMP Review     None          ED Provider  Electronically Signed by           Janny Corbin PA-C  03/13/22 [address] [place] [address] Patient with one or more new problems requiring additional work-up/treatment.

## 2022-04-04 ENCOUNTER — EMERGENCY (EMERGENCY)
Facility: HOSPITAL | Age: 52
LOS: 0 days | Discharge: HOME | End: 2022-04-04
Attending: EMERGENCY MEDICINE | Admitting: EMERGENCY MEDICINE
Payer: COMMERCIAL

## 2022-04-04 VITALS
RESPIRATION RATE: 18 BRPM | WEIGHT: 190.04 LBS | TEMPERATURE: 98 F | HEART RATE: 86 BPM | DIASTOLIC BLOOD PRESSURE: 83 MMHG | OXYGEN SATURATION: 99 % | SYSTOLIC BLOOD PRESSURE: 141 MMHG

## 2022-04-04 DIAGNOSIS — Z20.822 CONTACT WITH AND (SUSPECTED) EXPOSURE TO COVID-19: ICD-10-CM

## 2022-04-04 DIAGNOSIS — K52.9 NONINFECTIVE GASTROENTERITIS AND COLITIS, UNSPECIFIED: ICD-10-CM

## 2022-04-04 DIAGNOSIS — F41.9 ANXIETY DISORDER, UNSPECIFIED: ICD-10-CM

## 2022-04-04 DIAGNOSIS — M79.10 MYALGIA, UNSPECIFIED SITE: ICD-10-CM

## 2022-04-04 DIAGNOSIS — F17.200 NICOTINE DEPENDENCE, UNSPECIFIED, UNCOMPLICATED: ICD-10-CM

## 2022-04-04 DIAGNOSIS — R10.9 UNSPECIFIED ABDOMINAL PAIN: ICD-10-CM

## 2022-04-04 DIAGNOSIS — R19.7 DIARRHEA, UNSPECIFIED: ICD-10-CM

## 2022-04-04 DIAGNOSIS — Z87.440 PERSONAL HISTORY OF URINARY (TRACT) INFECTIONS: ICD-10-CM

## 2022-04-04 LAB
ALBUMIN SERPL ELPH-MCNC: 4.7 G/DL — SIGNIFICANT CHANGE UP (ref 3.5–5.2)
ALP SERPL-CCNC: 104 U/L — SIGNIFICANT CHANGE UP (ref 30–115)
ALT FLD-CCNC: 19 U/L — SIGNIFICANT CHANGE UP (ref 0–41)
ANION GAP SERPL CALC-SCNC: 9 MMOL/L — SIGNIFICANT CHANGE UP (ref 7–14)
APPEARANCE UR: CLEAR — SIGNIFICANT CHANGE UP
APTT BLD: 32.6 SEC — SIGNIFICANT CHANGE UP (ref 27–39.2)
AST SERPL-CCNC: 18 U/L — SIGNIFICANT CHANGE UP (ref 0–41)
BACTERIA # UR AUTO: ABNORMAL
BASOPHILS # BLD AUTO: 0.04 K/UL — SIGNIFICANT CHANGE UP (ref 0–0.2)
BASOPHILS NFR BLD AUTO: 0.4 % — SIGNIFICANT CHANGE UP (ref 0–1)
BILIRUB DIRECT SERPL-MCNC: <0.2 MG/DL — SIGNIFICANT CHANGE UP (ref 0–0.3)
BILIRUB INDIRECT FLD-MCNC: >0.1 MG/DL — LOW (ref 0.2–1.2)
BILIRUB SERPL-MCNC: 0.3 MG/DL — SIGNIFICANT CHANGE UP (ref 0.2–1.2)
BILIRUB UR-MCNC: NEGATIVE — SIGNIFICANT CHANGE UP
BLD GP AB SCN SERPL QL: SIGNIFICANT CHANGE UP
BUN SERPL-MCNC: 15 MG/DL — SIGNIFICANT CHANGE UP (ref 10–20)
CALCIUM SERPL-MCNC: 9.8 MG/DL — SIGNIFICANT CHANGE UP (ref 8.5–10.1)
CHLORIDE SERPL-SCNC: 104 MMOL/L — SIGNIFICANT CHANGE UP (ref 98–110)
CO2 SERPL-SCNC: 28 MMOL/L — SIGNIFICANT CHANGE UP (ref 17–32)
COLOR SPEC: YELLOW — SIGNIFICANT CHANGE UP
CREAT SERPL-MCNC: 0.8 MG/DL — SIGNIFICANT CHANGE UP (ref 0.7–1.5)
DIFF PNL FLD: ABNORMAL
EGFR: 88 ML/MIN/1.73M2 — SIGNIFICANT CHANGE UP
EOSINOPHIL # BLD AUTO: 0.11 K/UL — SIGNIFICANT CHANGE UP (ref 0–0.7)
EOSINOPHIL NFR BLD AUTO: 1 % — SIGNIFICANT CHANGE UP (ref 0–8)
EPI CELLS # UR: ABNORMAL /HPF
GLUCOSE SERPL-MCNC: 105 MG/DL — HIGH (ref 70–99)
GLUCOSE UR QL: NEGATIVE MG/DL — SIGNIFICANT CHANGE UP
HCT VFR BLD CALC: 45.8 % — SIGNIFICANT CHANGE UP (ref 37–47)
HGB BLD-MCNC: 14.6 G/DL — SIGNIFICANT CHANGE UP (ref 12–16)
IMM GRANULOCYTES NFR BLD AUTO: 0.3 % — SIGNIFICANT CHANGE UP (ref 0.1–0.3)
INR BLD: 1.04 RATIO — SIGNIFICANT CHANGE UP (ref 0.65–1.3)
KETONES UR-MCNC: NEGATIVE — SIGNIFICANT CHANGE UP
LACTATE SERPL-SCNC: 0.7 MMOL/L — SIGNIFICANT CHANGE UP (ref 0.7–2)
LEUKOCYTE ESTERASE UR-ACNC: NEGATIVE — SIGNIFICANT CHANGE UP
LIDOCAIN IGE QN: 45 U/L — SIGNIFICANT CHANGE UP (ref 7–60)
LYMPHOCYTES # BLD AUTO: 19 % — LOW (ref 20.5–51.1)
LYMPHOCYTES # BLD AUTO: 2.09 K/UL — SIGNIFICANT CHANGE UP (ref 1.2–3.4)
MCHC RBC-ENTMCNC: 29.4 PG — SIGNIFICANT CHANGE UP (ref 27–31)
MCHC RBC-ENTMCNC: 31.9 G/DL — LOW (ref 32–37)
MCV RBC AUTO: 92.3 FL — SIGNIFICANT CHANGE UP (ref 81–99)
MONOCYTES # BLD AUTO: 0.52 K/UL — SIGNIFICANT CHANGE UP (ref 0.1–0.6)
MONOCYTES NFR BLD AUTO: 4.7 % — SIGNIFICANT CHANGE UP (ref 1.7–9.3)
NEUTROPHILS # BLD AUTO: 8.19 K/UL — HIGH (ref 1.4–6.5)
NEUTROPHILS NFR BLD AUTO: 74.6 % — SIGNIFICANT CHANGE UP (ref 42.2–75.2)
NITRITE UR-MCNC: NEGATIVE — SIGNIFICANT CHANGE UP
NRBC # BLD: 0 /100 WBCS — SIGNIFICANT CHANGE UP (ref 0–0)
PH UR: 7 — SIGNIFICANT CHANGE UP (ref 5–8)
PLATELET # BLD AUTO: 248 K/UL — SIGNIFICANT CHANGE UP (ref 130–400)
POTASSIUM SERPL-MCNC: 4.6 MMOL/L — SIGNIFICANT CHANGE UP (ref 3.5–5)
POTASSIUM SERPL-SCNC: 4.6 MMOL/L — SIGNIFICANT CHANGE UP (ref 3.5–5)
PROT SERPL-MCNC: 7.3 G/DL — SIGNIFICANT CHANGE UP (ref 6–8)
PROT UR-MCNC: NEGATIVE MG/DL — SIGNIFICANT CHANGE UP
PROTHROM AB SERPL-ACNC: 12 SEC — SIGNIFICANT CHANGE UP (ref 9.95–12.87)
RBC # BLD: 4.96 M/UL — SIGNIFICANT CHANGE UP (ref 4.2–5.4)
RBC # FLD: 13.3 % — SIGNIFICANT CHANGE UP (ref 11.5–14.5)
RBC CASTS # UR COMP ASSIST: ABNORMAL /HPF
SARS-COV-2 RNA SPEC QL NAA+PROBE: SIGNIFICANT CHANGE UP
SODIUM SERPL-SCNC: 141 MMOL/L — SIGNIFICANT CHANGE UP (ref 135–146)
SP GR SPEC: 1.02 — SIGNIFICANT CHANGE UP (ref 1.01–1.03)
UROBILINOGEN FLD QL: 0.2 MG/DL — SIGNIFICANT CHANGE UP
WBC # BLD: 10.98 K/UL — HIGH (ref 4.8–10.8)
WBC # FLD AUTO: 10.98 K/UL — HIGH (ref 4.8–10.8)
WBC UR QL: SIGNIFICANT CHANGE UP /HPF

## 2022-04-04 PROCEDURE — 74177 CT ABD & PELVIS W/CONTRAST: CPT | Mod: 26,MA

## 2022-04-04 PROCEDURE — 99285 EMERGENCY DEPT VISIT HI MDM: CPT

## 2022-04-04 RX ORDER — MORPHINE SULFATE 50 MG/1
6 CAPSULE, EXTENDED RELEASE ORAL ONCE
Refills: 0 | Status: DISCONTINUED | OUTPATIENT
Start: 2022-04-04 | End: 2022-04-04

## 2022-04-04 RX ORDER — METRONIDAZOLE 500 MG
500 TABLET ORAL ONCE
Refills: 0 | Status: COMPLETED | OUTPATIENT
Start: 2022-04-04 | End: 2022-04-04

## 2022-04-04 RX ORDER — MORPHINE SULFATE 50 MG/1
4 CAPSULE, EXTENDED RELEASE ORAL ONCE
Refills: 0 | Status: DISCONTINUED | OUTPATIENT
Start: 2022-04-04 | End: 2022-04-04

## 2022-04-04 RX ORDER — CIPROFLOXACIN LACTATE 400MG/40ML
400 VIAL (ML) INTRAVENOUS ONCE
Refills: 0 | Status: COMPLETED | OUTPATIENT
Start: 2022-04-04 | End: 2022-04-04

## 2022-04-04 RX ORDER — SODIUM CHLORIDE 9 MG/ML
1000 INJECTION, SOLUTION INTRAVENOUS ONCE
Refills: 0 | Status: COMPLETED | OUTPATIENT
Start: 2022-04-04 | End: 2022-04-04

## 2022-04-04 RX ORDER — KETOROLAC TROMETHAMINE 30 MG/ML
30 SYRINGE (ML) INJECTION ONCE
Refills: 0 | Status: DISCONTINUED | OUTPATIENT
Start: 2022-04-04 | End: 2022-04-04

## 2022-04-04 RX ORDER — METRONIDAZOLE 500 MG
1 TABLET ORAL
Qty: 21 | Refills: 0
Start: 2022-04-04 | End: 2022-04-10

## 2022-04-04 RX ORDER — CIPROFLOXACIN LACTATE 400MG/40ML
1 VIAL (ML) INTRAVENOUS
Qty: 14 | Refills: 0
Start: 2022-04-04 | End: 2022-04-10

## 2022-04-04 RX ADMIN — SODIUM CHLORIDE 1000 MILLILITER(S): 9 INJECTION, SOLUTION INTRAVENOUS at 11:29

## 2022-04-04 RX ADMIN — MORPHINE SULFATE 6 MILLIGRAM(S): 50 CAPSULE, EXTENDED RELEASE ORAL at 11:29

## 2022-04-04 RX ADMIN — Medication 30 MILLIGRAM(S): at 15:51

## 2022-04-04 RX ADMIN — Medication 100 MILLIGRAM(S): at 15:15

## 2022-04-04 RX ADMIN — Medication 200 MILLIGRAM(S): at 15:14

## 2022-04-04 RX ADMIN — MORPHINE SULFATE 4 MILLIGRAM(S): 50 CAPSULE, EXTENDED RELEASE ORAL at 15:33

## 2022-04-04 RX ADMIN — MORPHINE SULFATE 4 MILLIGRAM(S): 50 CAPSULE, EXTENDED RELEASE ORAL at 15:12

## 2022-04-04 RX ADMIN — Medication 10 MILLIGRAM(S): at 15:51

## 2022-04-04 NOTE — ED PROVIDER NOTE - CLINICAL SUMMARY MEDICAL DECISION MAKING FREE TEXT BOX
patient evaluated for abd cramping, ct scan showed colitis, patient required pain medications and abx. symptoms imrpoved, treated with abx and patient to fu with gi for further work up.

## 2022-04-04 NOTE — ED PROVIDER NOTE - CARE PROVIDER_API CALL
Nazanin Bush (MD)  Gastroenterology  [address] [place] [address]  Phone: [phone]  Fax: [phone]  Follow Up Time: Routine

## 2022-04-04 NOTE — ED PROVIDER NOTE - CARE PLAN
Gemfibrozil 600 mg 1 tab twice daily  Last filled 3/4/19 #180 0 rf  Pt last seen 5/20/19 for MWV  To return in 1 yr f/u in 4 mo for MWV  Nov 9/12/19  Ok rx 180 1 rf  rx sent to [facility] via e-scribe     Principal Discharge DX:	Colitis   1

## 2022-04-04 NOTE — ED PROVIDER NOTE - PATIENT PORTAL LINK FT
You can access the FollowMyHealth Patient Portal offered by [facility] by registering at the following website: [email or web address]. By joining [employer]’s FollowMyHealth portal, you will also be able to view your health information using other applications (apps) compatible with our system.

## 2022-04-04 NOTE — ED PROVIDER NOTE - ATTENDING CONTRIBUTION TO CARE
3 day history of lower abdominal cramping one episodes of diarrhea with red colored blood with wiping mainly and since no bm for 1.5 days not associated with vomting. exam shows soft abd but mild tenderness, plan is to obtain labs and ct scan.

## 2022-04-04 NOTE — ED PROVIDER NOTE - NS ED ROS FT
Review of Systems:  CONSTITUTIONAL: No fever   SKIN: No rash  HEMATOLOGIC: No abnormal bleeding   EYES: No eye pain, No blurred vision  ENT: No sore throat, No neck pain, No rhinorrhea   RESPIRATORY: No shortness of breath, No cough  CARDIAC: No chest pain, No palpitations  GI: +abdominal pain, No nausea, No vomiting, +diarrhea, No constipation, +bright red blood per rectum. No flank pain.   : +dysuria, no frequency,  no hematuria.   MUSCULOSKELETAL: No joint paint, No swelling, No back pain  NEUROLOGIC: No numbness, No focal weakness, No headache, No dizziness  All other systems negative, unless specified in HPI

## 2022-04-04 NOTE — ED PROVIDER NOTE - PROGRESS NOTE DETAILS
Authored by Shaina George, DO: Patient feeling much better at this time. Patient to be discharged from ED. Any available test results were discussed with patient and/or family. Verbal instructions given, including instructions to return to ED immediately for any new, worsening, or concerning symptoms. Patient endorsed understanding. Written discharge instructions additionally given, including follow-up plan.

## 2022-04-04 NOTE — ED PROVIDER NOTE - OBJECTIVE STATEMENT
50 y/o F PMHx recurrent UTIs and anxiety presents to ED with cramping intermittent moderate abdominal pain x3 days associated with diarrhea and today noticed bright red blood in stool. No fevers, no vomiting. Pt reports last period was 8 months ago. Pt also reporting bodyaches, denies cough, chest pain, or congestion.

## 2022-04-04 NOTE — ED ADULT TRIAGE NOTE - CHIEF COMPLAINT QUOTE
pt c/o abdominal cramping an d that started on saturday, reports blood streaked on toilet paper after using the bathroom yesterday

## 2022-04-15 ENCOUNTER — APPOINTMENT (OUTPATIENT)
Dept: CARDIOLOGY | Facility: CLINIC | Age: 52
End: 2022-04-15

## 2022-06-06 ENCOUNTER — EMERGENCY (EMERGENCY)
Facility: HOSPITAL | Age: 52
LOS: 0 days | Discharge: HOME | End: 2022-06-06
Attending: EMERGENCY MEDICINE | Admitting: EMERGENCY MEDICINE
Payer: COMMERCIAL

## 2022-06-06 VITALS
SYSTOLIC BLOOD PRESSURE: 168 MMHG | OXYGEN SATURATION: 96 % | HEART RATE: 76 BPM | DIASTOLIC BLOOD PRESSURE: 99 MMHG | TEMPERATURE: 98 F | RESPIRATION RATE: 18 BRPM

## 2022-06-06 DIAGNOSIS — M25.561 PAIN IN RIGHT KNEE: ICD-10-CM

## 2022-06-06 DIAGNOSIS — S83.91XA SPRAIN OF UNSPECIFIED SITE OF RIGHT KNEE, INITIAL ENCOUNTER: ICD-10-CM

## 2022-06-06 DIAGNOSIS — S49.91XA UNSPECIFIED INJURY OF RIGHT SHOULDER AND UPPER ARM, INITIAL ENCOUNTER: ICD-10-CM
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
## 2022-06-06 DIAGNOSIS — S90.511A ABRASION, RIGHT ANKLE, INITIAL ENCOUNTER: ICD-10-CM
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
## 2022-06-06 DIAGNOSIS — Z87.891 PERSONAL HISTORY OF NICOTINE DEPENDENCE: ICD-10-CM

## 2022-06-06 DIAGNOSIS — F41.9 ANXIETY DISORDER, UNSPECIFIED: ICD-10-CM

## 2022-06-06 DIAGNOSIS — M25.511 PAIN IN RIGHT SHOULDER: ICD-10-CM

## 2022-06-06 PROCEDURE — 73060 X-RAY EXAM OF HUMERUS: CPT | Mod: 26,RT

## 2022-06-06 PROCEDURE — 99284 EMERGENCY DEPT VISIT MOD MDM: CPT

## 2022-06-06 PROCEDURE — 73562 X-RAY EXAM OF KNEE 3: CPT | Mod: 26,RT

## 2022-06-06 PROCEDURE — 73030 X-RAY EXAM OF SHOULDER: CPT | Mod: 26,RT

## 2022-06-06 RX ORDER — OXYCODONE AND ACETAMINOPHEN 5; 325 MG/1; MG/1
1 TABLET ORAL ONCE
Refills: 0 | Status: DISCONTINUED | OUTPATIENT
Start: 2022-06-06 | End: 2022-06-06

## 2022-06-06 RX ORDER — MORPHINE SULFATE 50 MG/1
8 CAPSULE, EXTENDED RELEASE ORAL ONCE
Refills: 0 | Status: DISCONTINUED | OUTPATIENT
Start: 2022-06-06 | End: 2022-06-06

## 2022-06-06 RX ORDER — OXYCODONE AND ACETAMINOPHEN 5; 325 MG/1; MG/1
1 TABLET ORAL
Qty: 6 | Refills: 0
Start: 2022-06-06

## 2022-06-06 RX ORDER — IBUPROFEN 200 MG
600 TABLET ORAL ONCE
Refills: 0 | Status: COMPLETED | OUTPATIENT
Start: 2022-06-06 | End: 2022-06-06

## 2022-06-06 RX ORDER — ACETAMINOPHEN 500 MG
650 TABLET ORAL ONCE
Refills: 0 | Status: COMPLETED | OUTPATIENT
Start: 2022-06-06 | End: 2022-06-06

## 2022-06-06 RX ADMIN — MORPHINE SULFATE 8 MILLIGRAM(S): 50 CAPSULE, EXTENDED RELEASE ORAL at 19:10

## 2022-06-06 RX ADMIN — Medication 650 MILLIGRAM(S): at 18:48

## 2022-06-06 RX ADMIN — OXYCODONE AND ACETAMINOPHEN 1 TABLET(S): 5; 325 TABLET ORAL at 21:22

## 2022-06-06 RX ADMIN — Medication 600 MILLIGRAM(S): at 18:48

## 2022-06-06 RX ADMIN — MORPHINE SULFATE 8 MILLIGRAM(S): 50 CAPSULE, EXTENDED RELEASE ORAL at 20:27

## 2022-06-06 NOTE — ED PROVIDER NOTE - NSFOLLOWUPCLINICS_GEN_ALL_ED_FT
[facility]  Orthpedic  [address], [place]   Phone: [phone]  Fax:

## 2022-06-06 NOTE — ED PROVIDER NOTE - PATIENT PORTAL LINK FT
You can access the FollowMyHealth Patient Portal offered by [facility] by registering at the following website: [email or web address]. By joining [employer]’s FollowMyHealth portal, you will also be able to view your health information using other applications (apps) compatible with our system.

## 2022-06-06 NOTE — ED PROVIDER NOTE - PHYSICAL EXAMINATION
CONST: Well appearing in NAD  EYES: PERRL, EOMI, Sclera and conjunctiva clear. Vision grossly intact  NECK: Non-tender, no meningeal signs  CARD: Normal S1 S2; Normal rate and rhythm  RESP: Equal BS B/L, No wheezes, rhonchi or rales. No distress  GI: Soft, non-tender, non-distended. No rebound or Guarding  MS: Tenderness over right patella, full range of motion no effusion, no laxity.  Tenderness over the right humeral head pain worse with range of motion.  Distal pulses intact cap refill less than 2 seconds  SKIN: Warm, dry, no acute rashes. Good turgor  NEURO: A&Ox3, No focal deficits. Strength 5/5 with no sensory deficits. Steady gait

## 2022-06-06 NOTE — ED PROVIDER NOTE - NS ED ATTENDING STATEMENT MOD
This was a shared visit with the ZEUS. I reviewed and verified the documentation and independently performed the documented:

## 2022-06-06 NOTE — ED PROVIDER NOTE - NS ED ROS FT
CONST: No fever, chills or bodyaches  CARD: No chest pain, palpitations  RESP: No SOB, cough, hemoptysis. No hx of asthma or COPD  GI: No abdominal pain, N/V/D  : No urinary symptoms  MS: Pain to right knee right ankle  SKIN: No rashes  NEURO: No headache, dizziness, paresthesias or LOC

## 2022-06-06 NOTE — ED PROVIDER NOTE - OBJECTIVE STATEMENT
51-year-old female presents emergency department complaining of pain to her right shoulder and right knee, also with abrasion to the right ankle.  Patient states that a large branch fell and when she tried to move her knee buckled and she fell to the ground landing on her right side.  Patient states this occurred about an hour prior to arrival, does not recall last tetanus.  Pain is worse with movement and pressure on the joints.  Patient did not take any medication for the pain denies weakness numbness swelling redness

## 2022-06-06 NOTE — ED PROVIDER NOTE - NSFOLLOWUPINSTRUCTIONS_ED_ALL_ED_FT
Contusion    A contusion is a deep bruise. Contusions are the result of a blunt injury to tissues and muscle fibers under the skin. The skin overlying the contusion may turn blue, purple, or yellow. Symptoms also include pain and swelling in the injured area.    SEEK IMMEDIATE MEDICAL CARE IF YOU HAVE ANY OF THE FOLLOWING SYMPTOMS: severe pain, numbness, tingling, pain, weakness, or skin color/temperature change in any part of your body distal to the injury.    Sprain    A sprain is a stretch or tear in one of the tough, fiber-like tissues (ligaments) in your body. This is caused by an injury to the area such as a twisting mechanism. Symptoms include pain, swelling, or bruising. Rest that area over the next several days and slowly resume activity when tolerated. Ice can help with swelling and pain.     SEEK IMMEDIATE MEDICAL CARE IF YOU HAVE ANY OF THE FOLLOWING SYMPTOMS: worsening pain, inability to move that body part, numbness or tingling.

## 2022-06-06 NOTE — ED PROVIDER NOTE - CLINICAL SUMMARY MEDICAL DECISION MAKING FREE TEXT BOX
Patient presented with right knee and right shoulder pain status post fall landing on her right side.  Otherwise on arrival patient afebrile, hemodynamically stable, neurovascularly intact.  Able to ambulate in the ED.  Obtained x-rays which were negative for acute fracture dislocation, and pain otherwise controlled.  Given the above, will discharge home with outpatient follow up. Patient agreeable with plan. Agrees to return to ED for any new or worsening symptoms.

## 2022-07-25 ENCOUNTER — LABORATORY RESULT (OUTPATIENT)
Age: 52
End: 2022-07-25

## 2022-07-25 ENCOUNTER — NON-APPOINTMENT (OUTPATIENT)
Age: 52
End: 2022-07-25

## 2022-07-27 ENCOUNTER — NON-APPOINTMENT (OUTPATIENT)
Age: 52
End: 2022-07-27

## 2022-07-28 ENCOUNTER — APPOINTMENT (OUTPATIENT)
Dept: UROGYNECOLOGY | Facility: CLINIC | Age: 52
End: 2022-07-28

## 2022-07-28 VITALS
BODY MASS INDEX: 33.23 KG/M2 | HEIGHT: 61 IN | DIASTOLIC BLOOD PRESSURE: 79 MMHG | HEART RATE: 99 BPM | SYSTOLIC BLOOD PRESSURE: 124 MMHG | WEIGHT: 176 LBS

## 2022-07-28 DIAGNOSIS — B96.89 ACUTE VAGINITIS: ICD-10-CM

## 2022-07-28 DIAGNOSIS — N39.3 STRESS INCONTINENCE (FEMALE) (MALE): ICD-10-CM

## 2022-07-28 DIAGNOSIS — B37.9 CANDIDIASIS, UNSPECIFIED: ICD-10-CM

## 2022-07-28 DIAGNOSIS — Z87.440 PERSONAL HISTORY OF URINARY (TRACT) INFECTIONS: ICD-10-CM

## 2022-07-28 DIAGNOSIS — N76.0 ACUTE VAGINITIS: ICD-10-CM

## 2022-07-28 LAB
APPEARANCE: CLEAR
BACTERIA UR CULT: NORMAL
BILIRUBIN URINE: ABNORMAL
BLOOD URINE: ABNORMAL
COLOR: ABNORMAL
GLUCOSE QUALITATIVE U: NEGATIVE
KETONES URINE: NEGATIVE
LEUKOCYTE ESTERASE URINE: NEGATIVE
NITRITE URINE: POSITIVE
PH URINE: 6.5
PROTEIN URINE: NORMAL
SPECIFIC GRAVITY URINE: 1.02
UROBILINOGEN URINE: NORMAL

## 2022-07-28 PROCEDURE — 99214 OFFICE O/P EST MOD 30 MIN: CPT | Mod: 25

## 2022-07-28 PROCEDURE — 51702 INSERT TEMP BLADDER CATH: CPT

## 2022-07-28 NOTE — HISTORY OF PRESENT ILLNESS
[FreeTextEntry1] : Patient is here for cath specimen today. \par Last seen on 3/8/22 for 2 month follow up for bladder pain and vaginal atrophy. \par \par S/p Urobel for burning, pain: had side effects, diarrhea, rash, did not help. \par \par Estrace cream 3x a week\par AZO prn\par \par 7/25/22: s/p Macrobid empirically treated, culture: contaminated\par \par Today pt c/o vaginal burning. Denies any burning or pain with urination. Her burning is inside and outside of the vagina. Pt has had this for many many years, all her life, has seen gyn Dr Wai Schuster, and was treated over the years, was prescribed compound boric acid, and referred to our office for vaginal burning. Pt is not sexually active at this time due to the burning. Vaginal burning is on and off. Using monistat OTC helps with burning.

## 2022-07-28 NOTE — DISCUSSION/SUMMARY
[FreeTextEntry1] : Vaginal burning\par Urine culture obtained.\par Will follow up.\par Will treat accordingly if necessary\par Advised to use Vitamin E oil intravaginally QHS\par Will consider using Gabapentin\par \par Vaginal discharge\par vaginal culture done\par \par Vaginal atrophy \par Cont estrace cream 3x a week \par Advised to hold during Metrogel use\par \par Candidiasis\par Rx Diflucan 150mg x2 doses\par \par Bacterial vaginitis\par Rx Metrogel QHS x 5 days

## 2022-07-28 NOTE — COUNSELING
[FreeTextEntry1] : If you feel like you have an infection it is important for you to call our office and we will arrange testing of your urine.\par \par Please take Diflucan 150 mg 1 tablet today and 1 tablet in 48 hours for yeast infection.\par \par Please use Metrogel per vagina at bedtime for 5 nights. You cannot have alcohol or sexual intercourse while using Metrogel.\par \par We will contact you if the urine results are abnormal.\par \par We will call you with vaginal culture results if abnormal.\par \par Please continue to apply a pea size amount to the opening of the vagina three times a week. \par \par Please call my office if you have any issues with the cost or side effects of the medication. \par \par Schedule a 6 months follow up appointment.\par

## 2022-07-28 NOTE — PHYSICAL EXAM
[Chaperone Present] : A chaperone was present in the examining room during all aspects of the physical examination [FreeTextEntry1] : Urethra was prepped in sterile fashion and then a sterile catheter (14F) was used by me to drain the bladder. The patient tolerated the procedure well.\par \par cath: 165cc\par \par Pelvic exam\par speculum: + thin white, gray discharge ? candidiasis? BV? [No Acute Distress] : in no acute distress [Well developed] : well developed [Well Nourished] : ~L well nourished

## 2022-07-29 LAB
APPEARANCE: CLEAR
BILIRUBIN URINE: NEGATIVE
BLOOD URINE: NEGATIVE
COLOR: YELLOW
GLUCOSE QUALITATIVE U: NEGATIVE
KETONES URINE: NEGATIVE
LEUKOCYTE ESTERASE URINE: NEGATIVE
NITRITE URINE: NEGATIVE
PH URINE: 6.5
PROTEIN URINE: NEGATIVE
SPECIFIC GRAVITY URINE: 1.01
UROBILINOGEN URINE: NORMAL

## 2022-07-30 LAB
BACTERIA UR CULT: NORMAL
CANDIDA VAG CYTO: NOT DETECTED
G VAGINALIS+PREV SP MTYP VAG QL MICRO: DETECTED
T VAGINALIS VAG QL WET PREP: NOT DETECTED

## 2022-07-30 RX ORDER — NITROFURANTOIN (MONOHYDRATE/MACROCRYSTALS) 25; 75 MG/1; MG/1
100 CAPSULE ORAL
Qty: 10 | Refills: 0 | Status: DISCONTINUED | COMMUNITY
Start: 2022-03-08 | End: 2022-07-30

## 2022-08-01 ENCOUNTER — NON-APPOINTMENT (OUTPATIENT)
Age: 52
End: 2022-08-01

## 2022-08-02 ENCOUNTER — NON-APPOINTMENT (OUTPATIENT)
Age: 52
End: 2022-08-02

## 2022-08-07 ENCOUNTER — EMERGENCY (EMERGENCY)
Facility: HOSPITAL | Age: 52
LOS: 0 days | Discharge: HOME | End: 2022-08-08
Attending: EMERGENCY MEDICINE | Admitting: EMERGENCY MEDICINE

## 2022-08-07 VITALS
OXYGEN SATURATION: 96 % | SYSTOLIC BLOOD PRESSURE: 132 MMHG | RESPIRATION RATE: 18 BRPM | TEMPERATURE: 96 F | DIASTOLIC BLOOD PRESSURE: 72 MMHG | HEART RATE: 62 BPM | WEIGHT: 182.1 LBS
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
## 2022-08-07 DIAGNOSIS — M25.531 PAIN IN RIGHT WRIST: ICD-10-CM

## 2022-08-07 DIAGNOSIS — S52.611A DISPLACED FRACTURE OF RIGHT ULNA STYLOID PROCESS, INITIAL ENCOUNTER FOR CLOSED FRACTURE: ICD-10-CM

## 2022-08-07 DIAGNOSIS — S52.571A OTHER INTRAARTICULAR FRACTURE OF LOWER END OF RIGHT RADIUS, INITIAL ENCOUNTER FOR CLOSED FRACTURE: ICD-10-CM
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
## 2022-08-07 DIAGNOSIS — F41.9 ANXIETY DISORDER, UNSPECIFIED: ICD-10-CM

## 2022-08-07 DIAGNOSIS — F17.200 NICOTINE DEPENDENCE, UNSPECIFIED, UNCOMPLICATED: ICD-10-CM

## 2022-08-07 PROCEDURE — 99284 EMERGENCY DEPT VISIT MOD MDM: CPT

## 2022-08-07 RX ORDER — IBUPROFEN 200 MG
600 TABLET ORAL ONCE
Refills: 0 | Status: COMPLETED | OUTPATIENT
Start: 2022-08-07 | End: 2022-08-07

## 2022-08-07 RX ORDER — OXYCODONE AND ACETAMINOPHEN 5; 325 MG/1; MG/1
1 TABLET ORAL ONCE
Refills: 0 | Status: DISCONTINUED | OUTPATIENT
Start: 2022-08-07 | End: 2022-08-07

## 2022-08-08 PROCEDURE — 73110 X-RAY EXAM OF WRIST: CPT | Mod: 26,RT,76

## 2022-08-08 PROCEDURE — 73070 X-RAY EXAM OF ELBOW: CPT | Mod: 26,RT

## 2022-08-08 PROCEDURE — 73090 X-RAY EXAM OF FOREARM: CPT | Mod: 26,RT

## 2022-08-08 RX ORDER — OXYCODONE AND ACETAMINOPHEN 5; 325 MG/1; MG/1
1 TABLET ORAL ONCE
Refills: 0 | Status: DISCONTINUED | OUTPATIENT
Start: 2022-08-08 | End: 2022-08-08

## 2022-08-08 RX ADMIN — OXYCODONE AND ACETAMINOPHEN 1 TABLET(S): 5; 325 TABLET ORAL at 01:54

## 2022-08-08 RX ADMIN — OXYCODONE AND ACETAMINOPHEN 1 TABLET(S): 5; 325 TABLET ORAL at 00:40

## 2022-08-08 RX ADMIN — Medication 600 MILLIGRAM(S): at 00:40

## 2022-08-08 NOTE — ED PROVIDER NOTE - OBJECTIVE STATEMENT
51 yold female to ED with no signif med hx c/o right wrist pain s/p fall while roller blading; pt denies head, neck, chest, back or abdominal pain; pt denies loc or current use of blood thinner;

## 2022-08-08 NOTE — ED PROVIDER NOTE - PROGRESS NOTE DETAILS
Fracture was reduced and splinted by orthopedics, patient was advised to follow-up with Ortho this week, strict precautions given.  Advise understanding and is amenable.

## 2022-08-08 NOTE — ED PROVIDER NOTE - CLINICAL SUMMARY MEDICAL DECISION MAKING FREE TEXT BOX
51-year-old female with closed right wrist fracture status post fall while rollerblading.  Neurovascularly intact, no elbow or other injuries.  Analgesia given, patient was evaluated orthopedics, fracture reduced, follow-up with Dr. Jin in 1 week.  Strict return precautions given.  Patient verbalized understanding and is amenable with the  plan.

## 2022-08-08 NOTE — ED PROVIDER NOTE - WR ORDER STATUS 1
The pathology results demonstrated EoE on bxs of the entire esophagus; BID PPI for now; Recommend referral to allergy;
Performed

## 2022-08-08 NOTE — ED PROVIDER NOTE - NS ED ROS FT
Constitutional: No fever, chills.  Eyes: No visual changes.  ENT: No hearing changes.  Neck: No neck pain or stiffness.  Cardiovascular: No chest pain, palpitations, edema.  Pulmonary: No SOB, cough. No hemoptysis.  Abdominal:  No nausea, vomiting, diarrhea.  : No dysuria, frequency.  Neuro: No headache, syncope, dizziness.  MS: seehpi  Psych: No suicidal ideations.

## 2022-08-08 NOTE — ED PROVIDER NOTE - NSFOLLOWUPINSTRUCTIONS_ED_ALL_ED_FT
Wrist Fracture Treated With Immobilization  A wrist fracture is a break or crack in one of the bones of your wrist. Your wrist is made up of eight small bones at the palm of your hand (carpal bones) and two long bones that make up your forearm (radius and ulna).    If the joint is stable and the bones are still in their normal position (nondisplaced), the injury may be treated with immobilization. This involves the use of a cast, splint, or sling to hold your arm in place. Immobilization ensures that your bones continue to stay in the correct position while your arm is healing.    What are the causes?  This condition may be caused by:    A direct force to the wrist.  Falling on an outstretched hand.  Trauma, such as a car accident or a fall.    What increases the risk?  This condition is more likely to develop in people who:    Do contact and high-risk sports, such as skiing, biking, and ice skating.  Take steroid medicines.  Smoke.  Are female.  Are .  Drink more than three alcoholic beverages per day.  Have low or lowered bone density (osteoporosis or osteopenia).  Are older.  Have a history of previous fractures.    What are the signs or symptoms?  Symptoms of this condition include:    Pain.  Swelling.  Bruising.  Not being able to move the wrist normally.    Additionally, the wrist may hang in an odd position or appear deformed.    How is this diagnosed?  This condition may be diagnosed based on a physical exam and X-rays. You may also have a CT scan or MRI.    How is this treated?  Treatment for this condition involves wearing a cast or splint until the injured area is stable enough for you to begin range-of-motion exercises. You also may be given a sling. You may also be prescribed pain medicine.    Follow these instructions at home:  If you have a splint:     Wear the splint as told by your health care provider. Remove it only as told by your health care provider.  Loosen the splint if your fingers tingle, become numb, or turn cold and blue.  Do not let your splint get wet if it is not waterproof.  Keep the splint clean.  If you have a sling:     Wear it as told by your health care provider. Remove it only as told by your health care provider.  If you have a cast:     Do not stick anything inside the cast to scratch your skin. Doing that increases your risk of infection.  Check the skin around the cast every day. Report any concerns to your health care provider. You may put lotion on dry skin around the edges of the cast. Do not apply lotion to the skin underneath the cast.  Do not let your cast get wet if it is not waterproof.  Keep the cast clean.  Bathing     Do not take baths, swim, or use a hot tub until your health care provider approves. Ask your health care provider if you can take showers. You may only be allowed to take sponge baths for bathing.  If your cast or splint is not waterproof, cover it with a watertight plastic bag when you take a bath or a shower.  If you have a sling, remove it for bathing only if your health care provider tells you that it is safe to do that.  Managing pain, stiffness, and swelling     If directed, apply ice to the injured area.    Put ice in a plastic bag.  Place a towel between your skin and the bag.  Leave the ice on for 20 minutes, 2–3 times per day.    Move your fingers often to avoid stiffness and to lessen swelling.  Raise (elevate) the injured area above the level of your heart while you are sitting or lying down.  Driving     Do not drive or operate heavy machinery while taking prescription pain medicine.  Ask your health care provider when it is safe to drive if you have a cast, splint, or sling on your wrist.  Activity     Return to your normal activities as told by your health care provider. Ask your health care provider what activities are safe for you.  Do range-of-motion exercises only as told by your health care provider or physical therapist.  General instructions     Do not put pressure on any part of the cast or splint until it is fully hardened. This may take several hours.  Do not use any tobacco products, such as cigarettes, chewing tobacco, and e-cigarettes. Tobacco can delay bone healing. If you need help quitting, ask your health care provider.  Take over-the-counter and prescription medicines only as told by your health care provider.  Keep all follow-up visits as told by your health care provider. This is important.  Contact a health care provider if:  Your cast, splint, or sling is damaged or loose.  You have any new pain, swelling, or bruising.  Your pain, swelling, and bruising do not improve.  You have a fever.  You have chills.  Get help right away if:  Your skin or fingers on your injured arm turn blue or gray.  Your arm feels cold or gets numb.  You have severe pain in your injured wrist.  This information is not intended to replace advice given to you by your health care provider. Make sure you discuss any questions you have with your health care provider.

## 2022-08-08 NOTE — CONSULT NOTE ADULT - SUBJECTIVE AND OBJECTIVE BOX
ORTHOPAEDIC SURGERY CONSULT NOTE    Reason for Consult: right distal radius fracture    HPI: 51yFemale RHD presents w/ pmhx anxiety presents with a right wrist injury. Patient reports she had a mechanical fall and landed on her right hand. Notes immediate pain and swelling to the area. Patient denies head trauma or LOC. Denies pain elsewhere. Denies paresthesias.    PMHx: anxiety  PSHx: denies  SHx: current smoker; not currently working    Allergies: No Known Allergies    Medications:     PHYSICAL EXAM:  Vital Signs Last 24 Hrs  T(C): 35.7 (07 Aug 2022 23:40), Max: 35.7 (07 Aug 2022 23:40)  T(F): 96.2 (07 Aug 2022 23:40), Max: 96.2 (07 Aug 2022 23:40)  HR: 62 (07 Aug 2022 23:40) (62 - 62)  BP: 132/72 (07 Aug 2022 23:40) (132/72 - 132/72)  BP(mean): --  RR: 18 (07 Aug 2022 23:40) (18 - 18)  SpO2: 96% (07 Aug 2022 23:40) (96% - 96%)    Parameters below as of 07 Aug 2022 23:40  Patient On (Oxygen Delivery Method): room air        Physical Exam:  General: NAD. AAOx3.  Resp: NLB on RA.    RUE:  No open skin or wounds  Edema to the wrist  TTP over the distal radius  NTTP shoulder, upper arm, elbow, hand  SILT R/M/U  AIN/PIN/U motor intact  Fingers wwp, cap refill < 2 sec    Labs:      Imaging:  XR:   Right dorsally displaced distal radius fracture    A/P: 51y Female with     - Procedure: ***  - Pain control  - NWB/WBAT/TTWB ***  - Keep cast/splint C/D/I. Cast/splint care explained.  - Extremity icing/elevation  - Patient instructed to return to ED if any worsening pain, numbness, tingling, fevers, chills or any other concerning symptoms  - F/U with  *** in 1 week. Please call [phone]. ORTHOPAEDIC SURGERY CONSULT NOTE    Reason for Consult: right distal radius fracture    HPI: 51yFemale RHD presents w/ pmhx anxiety presents with a right wrist injury. Patient reports she had a mechanical fall and landed on her right hand. Notes immediate pain and swelling to the area. Patient denies head trauma or LOC. Denies pain elsewhere. Denies paresthesias.    PMHx: anxiety  PSHx: denies  SHx: current smoker; not currently working    Allergies: No Known Allergies    Medications:     PHYSICAL EXAM:  Vital Signs Last 24 Hrs  T(C): 35.7 (07 Aug 2022 23:40), Max: 35.7 (07 Aug 2022 23:40)  T(F): 96.2 (07 Aug 2022 23:40), Max: 96.2 (07 Aug 2022 23:40)  HR: 62 (07 Aug 2022 23:40) (62 - 62)  BP: 132/72 (07 Aug 2022 23:40) (132/72 - 132/72)  BP(mean): --  RR: 18 (07 Aug 2022 23:40) (18 - 18)  SpO2: 96% (07 Aug 2022 23:40) (96% - 96%)    Parameters below as of 07 Aug 2022 23:40  Patient On (Oxygen Delivery Method): room air        Physical Exam:  General: NAD. AAOx3.  Resp: NLB on RA.    RUE:  No open skin or wounds  Edema to the wrist  TTP over the distal radius  NTTP shoulder, upper arm, elbow, hand  SILT R/M/U  AIN/PIN/U motor intact  Fingers wwp, cap refill < 2 sec    Labs:      Imaging:  XR:   Right dorsally displaced distal radius fracture    Procedure:  Hematoma block right wrist performed by the ED  Closed reduction and placement in sugar tong splint    A/P: 51y Female with a right distal radius fracture s/p closed reduction and placement in sugar tong splintj.    - NWB RUE  - Keep splint C/D/I. Splint care explained  - Pain control  - Extremity icing/elevation  - Patient instructed to return to ED if any worsening pain, numbness, tingling, fevers, chills or any other concerning symptoms  - F/U with Dr. Jin in 1 week. Please call [phone]. ORTHOPAEDIC SURGERY CONSULT NOTE    Reason for Consult: right distal radius fracture    HPI: 51yFemale RHD presents w/ pmhx anxiety presents with a right wrist injury. Patient reports she fell while roller skating earlier this evening and landed on her right hand. Notes immediate pain and swelling to the area. Patient denies head trauma or LOC. Denies pain elsewhere. Denies paresthesias.    PMHx: anxiety  PSHx: denies  SHx: current smoker; not currently working    Allergies: No Known Allergies    Medications:     PHYSICAL EXAM:  Vital Signs Last 24 Hrs  T(C): 35.7 (07 Aug 2022 23:40), Max: 35.7 (07 Aug 2022 23:40)  T(F): 96.2 (07 Aug 2022 23:40), Max: 96.2 (07 Aug 2022 23:40)  HR: 62 (07 Aug 2022 23:40) (62 - 62)  BP: 132/72 (07 Aug 2022 23:40) (132/72 - 132/72)  BP(mean): --  RR: 18 (07 Aug 2022 23:40) (18 - 18)  SpO2: 96% (07 Aug 2022 23:40) (96% - 96%)    Parameters below as of 07 Aug 2022 23:40  Patient On (Oxygen Delivery Method): room air        Physical Exam:  General: NAD. AAOx3.  Resp: NLB on RA.    RUE:  No open skin or wounds  Edema to the wrist  TTP over the distal radius  NTTP shoulder, upper arm, elbow, hand  SILT R/M/U  AIN/PIN/U motor intact  Fingers wwp, cap refill < 2 sec    Labs:      Imaging:  XR:   Right dorsally displaced distal radius fracture    Procedure:  Hematoma block right wrist performed by the ED  Closed reduction and placement in sugar tong splint    A/P: 51y Female with a right distal radius fracture s/p closed reduction and placement in sugar tong splintj.    - NWB RUE  - Keep splint C/D/I. Splint care explained  - Pain control  - Extremity icing/elevation  - Patient instructed to return to ED if any worsening pain, numbness, tingling, fevers, chills or any other concerning symptoms  - F/U with Dr. Jin in 1 week. Please call [phone].

## 2022-08-08 NOTE — ED PROVIDER NOTE - CARE PROVIDER_API CALL
Fariha Jin (MD)  Orthopaedic Surgery; Surgery of the Hand  [address] [place] [address]  Phone: [phone]  Fax: [phone]  Follow Up Time: 4-6 Days

## 2022-08-08 NOTE — ED PROVIDER NOTE - PHYSICAL EXAMINATION
Vital Signs: I have reviewed the initial vital signs.  Constitutional: well-nourished, appears stated age, no acute distress  Musculoskeletal: Right distal forearm/wrist: + swelling, mild deformity and bony tenderness noted with limited rom; sensation and pulses intact;   elbow, humerus and rigth shoulder non tender;

## 2022-08-08 NOTE — ED PROVIDER NOTE - PATIENT PORTAL LINK FT
You can access the FollowMyHealth Patient Portal offered by [facility] by registering at the following website: [email or web address]. By joining [facility]’s FollowMyHealth portal, you will also be able to view your health information using other applications (apps) compatible with our system.

## 2022-08-12 ENCOUNTER — APPOINTMENT (OUTPATIENT)
Dept: ORTHOPEDIC SURGERY | Facility: CLINIC | Age: 52
End: 2022-08-12

## 2022-08-12 VITALS — HEIGHT: 61 IN | WEIGHT: 176 LBS | BODY MASS INDEX: 33.23 KG/M2

## 2022-08-12 DIAGNOSIS — S52.509A UNSPECIFIED FRACTURE OF THE LOWER END OF UNSPECIFIED RADIUS, INITIAL ENCOUNTER FOR CLOSED FRACTURE: ICD-10-CM

## 2022-08-12 DIAGNOSIS — S52.551A OTHER EXTRAARTICULAR FRACTURE OF LOWER END OF RIGHT RADIUS, INITIAL ENCOUNTER FOR CLOSED FRACTURE: ICD-10-CM

## 2022-08-12 PROCEDURE — 73110 X-RAY EXAM OF WRIST: CPT | Mod: RT,76

## 2022-08-12 PROCEDURE — 99213 OFFICE O/P EST LOW 20 MIN: CPT | Mod: 25

## 2022-08-12 PROCEDURE — 29075 APPL CST ELBW FNGR SHORT ARM: CPT | Mod: RT

## 2022-08-12 RX ORDER — CLONAZEPAM 2 MG/1
2 TABLET ORAL DAILY
Refills: 0 | Status: DISCONTINUED | COMMUNITY
End: 2022-08-12

## 2022-08-12 RX ORDER — PHENAZOPYRIDINE 200 MG/1
200 TABLET, FILM COATED ORAL 3 TIMES DAILY
Qty: 9 | Refills: 0 | Status: DISCONTINUED | COMMUNITY
Start: 2022-03-08 | End: 2022-08-12

## 2022-08-12 RX ORDER — METRONIDAZOLE 7.5 MG/G
0.75 GEL VAGINAL
Qty: 1 | Refills: 0 | Status: DISCONTINUED | COMMUNITY
Start: 2022-07-28 | End: 2022-08-12

## 2022-08-12 RX ORDER — IBUPROFEN 800 MG/1
800 TABLET, FILM COATED ORAL 3 TIMES DAILY
Qty: 90 | Refills: 0 | Status: ACTIVE | COMMUNITY
Start: 2022-08-12 | End: 1900-01-01

## 2022-08-12 RX ORDER — METHENAMINE, SODIUM PHOSPHATE, MONOBASIC, MONOHYDRATE, PHENYL SALICYLATE, METHYLENE BLUE, AND HYOSCYAMINE SULFATE 118; 40.8; 36; 10; .12 MG/1; MG/1; MG/1; MG/1; MG/1
118 CAPSULE ORAL
Qty: 90 | Refills: 0 | Status: DISCONTINUED | COMMUNITY
Start: 2021-11-23 | End: 2022-08-12

## 2022-08-12 RX ORDER — ESTRADIOL 0.1 MG/G
0.1 CREAM VAGINAL
Qty: 1 | Refills: 6 | Status: DISCONTINUED | COMMUNITY
Start: 2021-09-22 | End: 2022-08-12

## 2022-08-12 RX ORDER — FLUCONAZOLE 150 MG/1
150 TABLET ORAL
Qty: 2 | Refills: 0 | Status: DISCONTINUED | COMMUNITY
Start: 2022-07-28 | End: 2022-08-12

## 2022-08-12 RX ORDER — NITROFURANTOIN (MONOHYDRATE/MACROCRYSTALS) 25; 75 MG/1; MG/1
100 CAPSULE ORAL
Qty: 10 | Refills: 0 | Status: DISCONTINUED | COMMUNITY
Start: 2022-07-25 | End: 2022-08-12

## 2022-08-12 NOTE — ASSESSMENT
[FreeTextEntry1] : Reviewed x-ray findings with patient.  Due to looseness of splint as well as generalized discomfort she was transitioned out of the splint and into a well-molded well fitted short-arm   Fiberglass cast.\par   X-rays were sent to Dr. Jin for review as well as review of care plan.  She will remain in the short-arm cast for the next 2 weeks.  She will come back in 2 weeks for repeat x-rays/repeat evaluation.\par   She was given oxycodone 5-325 to take up to t.i.d. for severe pain.  I also sent ibuprofen 800 mg t.i.d. for moderate pain.  The importance of activity modification and elevation of the affected limb as well as range of motion of the hand and elbow was heavily reinforced.\par   She expressed understanding of outlined care plan and had no additional questions or concerns at discharge\par \par This visit was seen under the direct supervision of Dr. Kyrie Naranjo

## 2022-08-12 NOTE — HISTORY OF PRESENT ILLNESS
[de-identified] : Patient is a 51-year-old female coming in today for a right distal radius / ulna styloid fracture.  She was roller-skating on the 7th August 2022 when she slipped and fell.  She sustained a dorsally dislocated distal radius fracture.  She was seen in the emergency room where x-rays were taken confirming fracture and a reduction was attempted.  She was placed in a splint and told to follow-up with orthopedics.

## 2022-08-12 NOTE — PHYSICAL EXAM
[Normal Coordination] : normal coordination [Normal DTR UE/LE] : normal DTR UE/LE  [Normal Sensation] : normal sensation [Normal Mood and Affect] : normal mood and affect [Orientated] : orientated [Normal Skin] : normal skin [No Rash] : no rash [No Ulcers] : no ulcers [No Lesions] : no lesions [No obvious lymphadenopathy in areas examined] : no obvious lymphadenopathy in areas examined [Right] : right hand [Distal Radius] : distal radius [Ulna Styloid] : ulna styloid [] : no scissoring of affected finger [FreeTextEntry9] :   Range of motion, strength and specialty testing deferred due to known fracture

## 2022-08-12 NOTE — DATA REVIEWED
[Right] : of the right [FreeTextEntry1] :  post cast x-rays show good anatomic alignment of distal radius fracture

## 2022-08-15 DIAGNOSIS — S52.571A OTHER INTRAARTICULAR FRACTURE OF LOWER END OF RIGHT RADIUS, INITIAL ENCOUNTER FOR CLOSED FRACTURE: ICD-10-CM

## 2022-08-17 ENCOUNTER — OUTPATIENT (OUTPATIENT)
Dept: OUTPATIENT SERVICES | Facility: HOSPITAL | Age: 52
LOS: 1 days | Discharge: HOME | End: 2022-08-17

## 2022-08-17 VITALS
TEMPERATURE: 98 F | WEIGHT: 182.1 LBS | HEIGHT: 61 IN | HEART RATE: 70 BPM | OXYGEN SATURATION: 99 % | DIASTOLIC BLOOD PRESSURE: 89 MMHG | SYSTOLIC BLOOD PRESSURE: 129 MMHG | RESPIRATION RATE: 18 BRPM

## 2022-08-17 DIAGNOSIS — Z01.818 ENCOUNTER FOR OTHER PREPROCEDURAL EXAMINATION: ICD-10-CM

## 2022-08-17 DIAGNOSIS — S52.571D OTHER INTRAARTICULAR FRACTURE OF LOWER END OF RIGHT RADIUS, SUBSEQUENT ENCOUNTER FOR CLOSED FRACTURE WITH ROUTINE HEALING: ICD-10-CM

## 2022-08-17 LAB
ALBUMIN SERPL ELPH-MCNC: 4.9 G/DL — SIGNIFICANT CHANGE UP (ref 3.5–5.2)
ALP SERPL-CCNC: 86 U/L — SIGNIFICANT CHANGE UP (ref 30–115)
ALT FLD-CCNC: 11 U/L — SIGNIFICANT CHANGE UP (ref 0–41)
ANION GAP SERPL CALC-SCNC: 9 MMOL/L — SIGNIFICANT CHANGE UP (ref 7–14)
APPEARANCE UR: CLEAR — SIGNIFICANT CHANGE UP
APTT BLD: 25.6 SEC — LOW (ref 27–39.2)
AST SERPL-CCNC: 17 U/L — SIGNIFICANT CHANGE UP (ref 0–41)
BASOPHILS # BLD AUTO: 0.03 K/UL — SIGNIFICANT CHANGE UP (ref 0–0.2)
BASOPHILS NFR BLD AUTO: 0.3 % — SIGNIFICANT CHANGE UP (ref 0–1)
BILIRUB SERPL-MCNC: 0.5 MG/DL — SIGNIFICANT CHANGE UP (ref 0.2–1.2)
BILIRUB UR-MCNC: NEGATIVE — SIGNIFICANT CHANGE UP
BUN SERPL-MCNC: 17 MG/DL — SIGNIFICANT CHANGE UP (ref 10–20)
CALCIUM SERPL-MCNC: 9.6 MG/DL — SIGNIFICANT CHANGE UP (ref 8.5–10.1)
CHLORIDE SERPL-SCNC: 102 MMOL/L — SIGNIFICANT CHANGE UP (ref 98–110)
CO2 SERPL-SCNC: 28 MMOL/L — SIGNIFICANT CHANGE UP (ref 17–32)
COLOR SPEC: YELLOW — SIGNIFICANT CHANGE UP
CREAT SERPL-MCNC: 0.7 MG/DL — SIGNIFICANT CHANGE UP (ref 0.7–1.5)
DIFF PNL FLD: NEGATIVE — SIGNIFICANT CHANGE UP
EGFR: 103 ML/MIN/1.73M2 — SIGNIFICANT CHANGE UP
EOSINOPHIL # BLD AUTO: 0.07 K/UL — SIGNIFICANT CHANGE UP (ref 0–0.7)
EOSINOPHIL NFR BLD AUTO: 0.8 % — SIGNIFICANT CHANGE UP (ref 0–8)
GLUCOSE SERPL-MCNC: 79 MG/DL — SIGNIFICANT CHANGE UP (ref 70–99)
GLUCOSE UR QL: NEGATIVE — SIGNIFICANT CHANGE UP
HCT VFR BLD CALC: 43.2 % — SIGNIFICANT CHANGE UP (ref 37–47)
HGB BLD-MCNC: 13.5 G/DL — SIGNIFICANT CHANGE UP (ref 12–16)
IMM GRANULOCYTES NFR BLD AUTO: 0.3 % — SIGNIFICANT CHANGE UP (ref 0.1–0.3)
INR BLD: 1.02 RATIO — SIGNIFICANT CHANGE UP (ref 0.65–1.3)
KETONES UR-MCNC: NEGATIVE — SIGNIFICANT CHANGE UP
LEUKOCYTE ESTERASE UR-ACNC: NEGATIVE — SIGNIFICANT CHANGE UP
LYMPHOCYTES # BLD AUTO: 1.69 K/UL — SIGNIFICANT CHANGE UP (ref 1.2–3.4)
LYMPHOCYTES # BLD AUTO: 19.3 % — LOW (ref 20.5–51.1)
MCHC RBC-ENTMCNC: 30.1 PG — SIGNIFICANT CHANGE UP (ref 27–31)
MCHC RBC-ENTMCNC: 31.3 G/DL — LOW (ref 32–37)
MCV RBC AUTO: 96.4 FL — SIGNIFICANT CHANGE UP (ref 81–99)
MONOCYTES # BLD AUTO: 0.47 K/UL — SIGNIFICANT CHANGE UP (ref 0.1–0.6)
MONOCYTES NFR BLD AUTO: 5.4 % — SIGNIFICANT CHANGE UP (ref 1.7–9.3)
NEUTROPHILS # BLD AUTO: 6.45 K/UL — SIGNIFICANT CHANGE UP (ref 1.4–6.5)
NEUTROPHILS NFR BLD AUTO: 73.9 % — SIGNIFICANT CHANGE UP (ref 42.2–75.2)
NITRITE UR-MCNC: NEGATIVE — SIGNIFICANT CHANGE UP
NRBC # BLD: 0 /100 WBCS — SIGNIFICANT CHANGE UP (ref 0–0)
PH UR: 7 — SIGNIFICANT CHANGE UP (ref 5–8)
PLATELET # BLD AUTO: 243 K/UL — SIGNIFICANT CHANGE UP (ref 130–400)
POTASSIUM SERPL-MCNC: 5.5 MMOL/L — HIGH (ref 3.5–5)
POTASSIUM SERPL-SCNC: 5.5 MMOL/L — HIGH (ref 3.5–5)
PROT SERPL-MCNC: 7 G/DL — SIGNIFICANT CHANGE UP (ref 6–8)
PROT UR-MCNC: NEGATIVE — SIGNIFICANT CHANGE UP
PROTHROM AB SERPL-ACNC: 11.7 SEC — SIGNIFICANT CHANGE UP (ref 9.95–12.87)
RBC # BLD: 4.48 M/UL — SIGNIFICANT CHANGE UP (ref 4.2–5.4)
RBC # FLD: 14.3 % — SIGNIFICANT CHANGE UP (ref 11.5–14.5)
SODIUM SERPL-SCNC: 139 MMOL/L — SIGNIFICANT CHANGE UP (ref 135–146)
SP GR SPEC: 1.02 — SIGNIFICANT CHANGE UP (ref 1.01–1.03)
UROBILINOGEN FLD QL: SIGNIFICANT CHANGE UP
WBC # BLD: 8.74 K/UL — SIGNIFICANT CHANGE UP (ref 4.8–10.8)
WBC # FLD AUTO: 8.74 K/UL — SIGNIFICANT CHANGE UP (ref 4.8–10.8)

## 2022-08-17 PROCEDURE — 93010 ELECTROCARDIOGRAM REPORT: CPT

## 2022-08-17 NOTE — H&P PST ADULT - REASON FOR ADMISSION
Patient is a 51  year old  female presenting to PAST in preparation for   OPEN REDUCTION INTERNAL FIXATION RIGHT WRIST FRACTURE on    8/24/22 under regional anesthesia by Dr. jose

## 2022-08-17 NOTE — H&P PST ADULT - HISTORY OF PRESENT ILLNESS
pt fell while roller blading   fx right wrist    now for planned procedure    PATIENT CURRENTLY DENIES CHEST PAIN  SHORTNESS OF BREATH  PALPITATIONS,  DYSURIA, OR UPPER RESPIRATORY INFECTION IN PAST 2 WEEKS  EXERCISE  TOLERANCE  1-2 FLIGHT OF STAIRS  WITHOUT SHORTNESS OF BREATH  denies travel outside the [place] in the past 30 days  + covid 2020  Patient denies any signs or symptoms of COVID 19 and denies contact with known positive individuals.  They have an appointment for repeat COVID testing pre-procedure and acknowledge its time and place.  They were instructed to quarantine pre-procedure, practice exposure control measures, continue to self-monitor and report any concerns to their proceduralist.  pt advised self quarantine till day of procedure  Anesthesia Alert  Difficult Airway class iv  NO--History of neck surgery or radiation  NO--Limited ROM of neck  NO--History of Malignant hyperthermia  NO--No personal or family history of Pseudocholinesterase deficiency.  NO--Prior Anesthesia Complication  NO--Latex Allergy  NO--Loose teeth  NO--History of Rheumatoid Arthritis  NO--Bleeding risk  + ANISHA no machine - lost weight   NO--Other_____    PT DENIES ANY RASHES, ABRASION, OR OPEN WOUNDS OR CUTS    AS PER THE PT, THIS IS HIS/HER COMPLETE MEDICAL AND SURGICAL HX, INCLUDING MEDICATIONS PRESCRIBED AND OVER THE COUNTER    Patient verbalized understanding of instructions and was given the opportunity to ask questions and have them answered.    pt denies any suicidal ideation or thoughts, pt states not a threat to self or others    S52.571A/[id number]    Other intraarticular fracture of lower end of right radius, subsequent encounter for closed fracture with routine healing    Encounter for other preprocedural examination    ^S52.571A/[id number]    Other intraarticular fracture of lower end of right radius, subsequent encounter for closed fracture with routine healing    Encounter for other preprocedural examination    Anxiety    No significant past surgical history

## 2022-08-18 ENCOUNTER — OUTPATIENT (OUTPATIENT)
Dept: OUTPATIENT SERVICES | Facility: HOSPITAL | Age: 52
LOS: 1 days | Discharge: HOME | End: 2022-08-18

## 2022-08-18 DIAGNOSIS — Z02.9 ENCOUNTER FOR ADMINISTRATIVE EXAMINATIONS, UNSPECIFIED: ICD-10-CM

## 2022-08-18 LAB
POTASSIUM SERPL-MCNC: 5 MMOL/L — SIGNIFICANT CHANGE UP (ref 3.5–5)
POTASSIUM SERPL-SCNC: 5 MMOL/L — SIGNIFICANT CHANGE UP (ref 3.5–5)

## 2022-08-21 ENCOUNTER — LABORATORY RESULT (OUTPATIENT)
Age: 52
End: 2022-08-21

## 2022-08-24 ENCOUNTER — TRANSCRIPTION ENCOUNTER (OUTPATIENT)
Age: 52
End: 2022-08-24

## 2022-08-24 ENCOUNTER — APPOINTMENT (OUTPATIENT)
Dept: ORTHOPEDIC SURGERY | Facility: CLINIC | Age: 52
End: 2022-08-24

## 2022-08-24 ENCOUNTER — OUTPATIENT (OUTPATIENT)
Dept: OUTPATIENT SERVICES | Facility: HOSPITAL | Age: 52
LOS: 1 days | Discharge: HOME | End: 2022-08-24

## 2022-08-24 ENCOUNTER — APPOINTMENT (OUTPATIENT)
Age: 52
End: 2022-08-24

## 2022-08-24 VITALS
SYSTOLIC BLOOD PRESSURE: 130 MMHG | TEMPERATURE: 98 F | HEIGHT: 61 IN | OXYGEN SATURATION: 98 % | RESPIRATION RATE: 18 BRPM | DIASTOLIC BLOOD PRESSURE: 71 MMHG | WEIGHT: 182.1 LBS | HEART RATE: 59 BPM

## 2022-08-24 VITALS
HEART RATE: 66 BPM | OXYGEN SATURATION: 97 % | DIASTOLIC BLOOD PRESSURE: 71 MMHG | RESPIRATION RATE: 20 BRPM | SYSTOLIC BLOOD PRESSURE: 129 MMHG

## 2022-08-24 PROCEDURE — 25607 OPTX DST RD XARTC FX/EPI SEP: CPT | Mod: RT

## 2022-08-24 RX ORDER — IBUPROFEN 200 MG
1 TABLET ORAL
Qty: 20 | Refills: 0
Start: 2022-08-24

## 2022-08-24 RX ORDER — SODIUM CHLORIDE 9 MG/ML
1000 INJECTION, SOLUTION INTRAVENOUS
Refills: 0 | Status: DISCONTINUED | OUTPATIENT
Start: 2022-08-24 | End: 2022-09-07

## 2022-08-24 RX ORDER — OXYCODONE AND ACETAMINOPHEN 5; 325 MG/1; MG/1
1 TABLET ORAL ONCE
Refills: 0 | Status: DISCONTINUED | OUTPATIENT
Start: 2022-08-24 | End: 2022-08-24

## 2022-08-24 RX ORDER — OXYCODONE AND ACETAMINOPHEN 5; 325 MG/1; MG/1
1 TABLET ORAL
Qty: 20 | Refills: 0
Start: 2022-08-24

## 2022-08-24 RX ADMIN — SODIUM CHLORIDE 125 MILLILITER(S): 9 INJECTION, SOLUTION INTRAVENOUS at 09:47

## 2022-08-24 NOTE — BRIEF OPERATIVE NOTE - NSICDXBRIEFPREOP_GEN_ALL_CORE_FT
PRE-OP DIAGNOSIS:  Other intra-articular fracture of distal end of right radius 24-Aug-2022 07:29:43  Edi Lou

## 2022-08-24 NOTE — BRIEF OPERATIVE NOTE - NSICDXBRIEFPOSTOP_GEN_ALL_CORE_FT
POST-OP DIAGNOSIS:  Other intra-articular fracture of distal end of right radius 24-Aug-2022 07:29:51  Edi Lou

## 2022-08-24 NOTE — ASU DISCHARGE PLAN (ADULT/PEDIATRIC) - CARE PROVIDER_API CALL
Edi Lou)  Orthopaedic Surgery  [address] [place] [address]  Phone: [phone]  Fax: [phone]  Follow Up Time:

## 2022-08-24 NOTE — ASU DISCHARGE PLAN (ADULT/PEDIATRIC) - NS MD DC FALL RISK RISK
For information on Fall & Injury Prevention, visit: [email or web address] OR  [email or web address] OR  https://www.cdc.gov/steadi/patient.html

## 2022-08-24 NOTE — CHART NOTE - NSCHARTNOTEFT_GEN_A_CORE
PACU ANESTHESIA ADMISSION NOTE      Procedure: ORIF, 3-part fracture, radius, distal, intra-articular      Post op diagnosis:  Other intra-articular fracture of distal end of right radius        ____  Intubated  TV:______       Rate: ______      FiO2: ______    __x__  Patent Airway    __x__  Full return of protective reflexes    __x__  Full recovery from anesthesia / back to baseline status    Vitals  HR: 55  BP: 132/70  RR: 18  O2 Sat: 97%  Temp: 97.4    Mental Status:  __x__ Awake   ___x__ Alert   _____ Drowsy   _____ Sedated    Nausea/Vomiting:  __x__ NO  ______Yes,   See Post - Op Orders          Pain Scale (0-10):  _____    Treatment: ____ None    __x__ See Post - Op/PCA Orders    Post - Operative Fluids:   ____ Oral   __x__ See Post - Op Orders    Plan: Discharge when criteria met:   __x__Home       _____Floor     _____Critical Care   Other:_________________    Comments: Patient had smooth intraoperative event, no anesthesia complication.

## 2022-08-24 NOTE — PRE-ANESTHESIA EVALUATION ADULT - NSANTHOSAYNRD_GEN_A_CORE
No. ANISHA screening performed.  STOP BANG Legend: 0-2 = LOW Risk; 3-4 = INTERMEDIATE Risk; 5-8 = HIGH Risk
+ margaret/Yes

## 2022-08-25 ENCOUNTER — NON-APPOINTMENT (OUTPATIENT)
Age: 52
End: 2022-08-25

## 2022-08-26 DIAGNOSIS — S52.501A UNSPECIFIED FRACTURE OF THE LOWER END OF RIGHT RADIUS, INITIAL ENCOUNTER FOR CLOSED FRACTURE: ICD-10-CM
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
## 2022-09-01 ENCOUNTER — RESULT CHARGE (OUTPATIENT)
Age: 52
End: 2022-09-01

## 2022-09-01 ENCOUNTER — APPOINTMENT (OUTPATIENT)
Dept: ORTHOPEDIC SURGERY | Facility: CLINIC | Age: 52
End: 2022-09-01

## 2022-09-01 PROCEDURE — 99024 POSTOP FOLLOW-UP VISIT: CPT

## 2022-09-01 PROCEDURE — 73110 X-RAY EXAM OF WRIST: CPT | Mod: RT

## 2022-09-01 NOTE — DISCUSSION/SUMMARY
[de-identified] : Today I removed the postop splint and sutures.\par The patient will wear a custom splint that can be removed for bathing and gentle range of motion.\par She will start therapy and follow up with Dr. Lou in 3-4 weeks for repeat x-ray and evaluation.\par I gave her a refill of percocet 5/325. She understands this will be her last prescription of pain meds.\par All questions were answered today.\par

## 2022-09-01 NOTE — HISTORY OF PRESENT ILLNESS
[de-identified] :   Patient is a 51-year-old female here for her 1st postop appointment.  She is status post an ORIF of the right wrist done by Dr. Lou.  She is doing relatively well. She still complains of pain.

## 2022-09-01 NOTE — PHYSICAL EXAM
[de-identified] : Physical exam of her right wrist:  Resolving swelling and ecchymosis.  The wound is clean and dry.  No signs of drainage, pus, or infection.  Good motion of the wrist and hand with some pain.\par

## 2022-09-01 NOTE — DATA REVIEWED
[FreeTextEntry1] : X-rays taken the office today of her right wrist show the fracture in anatomic alignment and all postsurgical hardware intact.\par

## 2022-09-15 ENCOUNTER — APPOINTMENT (OUTPATIENT)
Dept: GASTROENTEROLOGY | Facility: CLINIC | Age: 52
End: 2022-09-15

## 2022-09-21 ENCOUNTER — APPOINTMENT (OUTPATIENT)
Dept: ORTHOPEDIC SURGERY | Facility: CLINIC | Age: 52
End: 2022-09-21

## 2022-09-21 VITALS — WEIGHT: 176 LBS | BODY MASS INDEX: 33.23 KG/M2 | HEIGHT: 61 IN

## 2022-09-21 PROCEDURE — 73110 X-RAY EXAM OF WRIST: CPT | Mod: RT

## 2022-09-21 PROCEDURE — 99024 POSTOP FOLLOW-UP VISIT: CPT

## 2022-09-21 NOTE — HISTORY OF PRESENT ILLNESS
[de-identified] :   Patient is a 51-year-old female here for her 2nd postop appointment.  She is status post an ORIF of the right wrist done by Dr. Lou.  She is feeling much better.  Her symptoms are improving.

## 2022-09-21 NOTE — PHYSICAL EXAM
[de-identified] : Physical exam of her right wrist:  Minimal swelling.  There is a well-healed surgical scar. No signs of drainage, pus, or infection.  Good motion of the wrist and hand with some pain.\par

## 2022-09-21 NOTE — DISCUSSION/SUMMARY
[de-identified] :   She is about a month status post surgery.\par She may start to wean herself out of the brace.  She will continue with therapy.\par She will continue to avoid any pushing, pulling, or heavy lifting for about another 2 weeks.\par The patient is requesting 1 more refill of pain medication.  I provided her with a prescription to hold her over until she gets to pain management.  She understands this will be the last prescription she gets from Orthopedics for this surgery.\par She will follow up in about a month with Dr. Lou for repeat evaluation.  No x-rays needed at that appointment.\par All questions were answered today.

## 2022-10-08 ENCOUNTER — NON-APPOINTMENT (OUTPATIENT)
Age: 52
End: 2022-10-08

## 2022-10-12 ENCOUNTER — APPOINTMENT (OUTPATIENT)
Dept: ORTHOPEDIC SURGERY | Facility: CLINIC | Age: 52
End: 2022-10-12

## 2022-10-14 ENCOUNTER — APPOINTMENT (OUTPATIENT)
Dept: ORTHOPEDIC SURGERY | Facility: CLINIC | Age: 52
End: 2022-10-14

## 2022-10-14 VITALS — HEIGHT: 61 IN | BODY MASS INDEX: 33.23 KG/M2 | WEIGHT: 176 LBS

## 2022-10-14 PROCEDURE — 99024 POSTOP FOLLOW-UP VISIT: CPT

## 2022-10-14 NOTE — ASSESSMENT
[FreeTextEntry1] :   Patient is healing her right-sided wrist fractures developed carpal tunnel syndrome.  Going to give her Medrol Dosepak.  It couple of pain medications as well.  She will see us back in 6 weeks.  The possibility of needing carpal tunnel release surgery was discussed.  Id the possibility of going to pain management for evaluation was discussed as well.  Right now will hold off on pain management to see how the Medrol Dosepak work.

## 2022-10-14 NOTE — HISTORY OF PRESENT ILLNESS
[de-identified] : 51-year-old female status post internal fixation of a right wrist fracture.  She is doing well from the fracture but she developed carpal tunnel syndrome the postoperative period and she is bothered significantly by numbness and tingling in the fingers.  There is a bit of numbness is there all the time and is increasing numbness with sleep and activities

## 2022-10-14 NOTE — PHYSICAL EXAM
[de-identified] :  Patient has excellent finger range of motion.  Excellent wrist range of motion.  Patient has positive Tinel's and median nerve compression test.  She has slight diminished sensation median nerve distribution.

## 2022-11-28 ENCOUNTER — APPOINTMENT (OUTPATIENT)
Dept: ORTHOPEDIC SURGERY | Facility: CLINIC | Age: 52
End: 2022-11-28

## 2023-02-06 ENCOUNTER — APPOINTMENT (OUTPATIENT)
Dept: UROGYNECOLOGY | Facility: CLINIC | Age: 53
End: 2023-02-06

## 2023-02-12 ENCOUNTER — INPATIENT (INPATIENT)
Facility: HOSPITAL | Age: 53
LOS: 1 days | Discharge: HOME | DRG: 179 | End: 2023-02-14
Attending: INTERNAL MEDICINE | Admitting: STUDENT IN AN ORGANIZED HEALTH CARE EDUCATION/TRAINING PROGRAM
Payer: COMMERCIAL

## 2023-02-12 VITALS
OXYGEN SATURATION: 97 % | SYSTOLIC BLOOD PRESSURE: 182 MMHG | DIASTOLIC BLOOD PRESSURE: 82 MMHG | HEART RATE: 80 BPM | RESPIRATION RATE: 18 BRPM | TEMPERATURE: 98 F

## 2023-02-12 DIAGNOSIS — Z87.81 PERSONAL HISTORY OF (HEALED) TRAUMATIC FRACTURE: Chronic | ICD-10-CM

## 2023-02-12 DIAGNOSIS — R51.9 HEADACHE, UNSPECIFIED: ICD-10-CM

## 2023-02-12 LAB
ALBUMIN SERPL ELPH-MCNC: 4.4 G/DL — SIGNIFICANT CHANGE UP (ref 3.5–5.2)
ALP SERPL-CCNC: 78 U/L — SIGNIFICANT CHANGE UP (ref 30–115)
ALT FLD-CCNC: 13 U/L — SIGNIFICANT CHANGE UP (ref 0–41)
ANION GAP SERPL CALC-SCNC: 6 MMOL/L — LOW (ref 7–14)
APPEARANCE UR: CLEAR — SIGNIFICANT CHANGE UP
AST SERPL-CCNC: 14 U/L — SIGNIFICANT CHANGE UP (ref 0–41)
BACTERIA # UR AUTO: NEGATIVE — SIGNIFICANT CHANGE UP
BASOPHILS # BLD AUTO: 0.03 K/UL — SIGNIFICANT CHANGE UP (ref 0–0.2)
BASOPHILS NFR BLD AUTO: 0.4 % — SIGNIFICANT CHANGE UP (ref 0–1)
BILIRUB SERPL-MCNC: 0.3 MG/DL — SIGNIFICANT CHANGE UP (ref 0.2–1.2)
BILIRUB UR-MCNC: NEGATIVE — SIGNIFICANT CHANGE UP
BUN SERPL-MCNC: 21 MG/DL — HIGH (ref 10–20)
CALCIUM SERPL-MCNC: 9.4 MG/DL — SIGNIFICANT CHANGE UP (ref 8.4–10.4)
CHLORIDE SERPL-SCNC: 103 MMOL/L — SIGNIFICANT CHANGE UP (ref 98–110)
CO2 SERPL-SCNC: 30 MMOL/L — SIGNIFICANT CHANGE UP (ref 17–32)
COLOR SPEC: SIGNIFICANT CHANGE UP
CREAT SERPL-MCNC: 0.7 MG/DL — SIGNIFICANT CHANGE UP (ref 0.7–1.5)
DIFF PNL FLD: ABNORMAL
EGFR: 103 ML/MIN/1.73M2 — SIGNIFICANT CHANGE UP
EOSINOPHIL # BLD AUTO: 0.12 K/UL — SIGNIFICANT CHANGE UP (ref 0–0.7)
EOSINOPHIL NFR BLD AUTO: 1.6 % — SIGNIFICANT CHANGE UP (ref 0–8)
EPI CELLS # UR: 2 /HPF — SIGNIFICANT CHANGE UP (ref 0–5)
FLUAV AG NPH QL: SIGNIFICANT CHANGE UP
FLUBV AG NPH QL: SIGNIFICANT CHANGE UP
GLUCOSE SERPL-MCNC: 94 MG/DL — SIGNIFICANT CHANGE UP (ref 70–99)
GLUCOSE UR QL: NEGATIVE — SIGNIFICANT CHANGE UP
HCG SERPL QL: NEGATIVE — SIGNIFICANT CHANGE UP
HCT VFR BLD CALC: 45.2 % — SIGNIFICANT CHANGE UP (ref 37–47)
HGB BLD-MCNC: 14.7 G/DL — SIGNIFICANT CHANGE UP (ref 12–16)
HYALINE CASTS # UR AUTO: 0 /LPF — SIGNIFICANT CHANGE UP (ref 0–7)
IMM GRANULOCYTES NFR BLD AUTO: 0.3 % — SIGNIFICANT CHANGE UP (ref 0.1–0.3)
KETONES UR-MCNC: NEGATIVE — SIGNIFICANT CHANGE UP
LEUKOCYTE ESTERASE UR-ACNC: NEGATIVE — SIGNIFICANT CHANGE UP
LIDOCAIN IGE QN: 60 U/L — SIGNIFICANT CHANGE UP (ref 7–60)
LYMPHOCYTES # BLD AUTO: 2.12 K/UL — SIGNIFICANT CHANGE UP (ref 1.2–3.4)
LYMPHOCYTES # BLD AUTO: 28.1 % — SIGNIFICANT CHANGE UP (ref 20.5–51.1)
MAGNESIUM SERPL-MCNC: 2 MG/DL — SIGNIFICANT CHANGE UP (ref 1.8–2.4)
MCHC RBC-ENTMCNC: 30.6 PG — SIGNIFICANT CHANGE UP (ref 27–31)
MCHC RBC-ENTMCNC: 32.5 G/DL — SIGNIFICANT CHANGE UP (ref 32–37)
MCV RBC AUTO: 94 FL — SIGNIFICANT CHANGE UP (ref 81–99)
MONOCYTES # BLD AUTO: 0.48 K/UL — SIGNIFICANT CHANGE UP (ref 0.1–0.6)
MONOCYTES NFR BLD AUTO: 6.4 % — SIGNIFICANT CHANGE UP (ref 1.7–9.3)
NEUTROPHILS # BLD AUTO: 4.77 K/UL — SIGNIFICANT CHANGE UP (ref 1.4–6.5)
NEUTROPHILS NFR BLD AUTO: 63.2 % — SIGNIFICANT CHANGE UP (ref 42.2–75.2)
NITRITE UR-MCNC: NEGATIVE — SIGNIFICANT CHANGE UP
NRBC # BLD: 0 /100 WBCS — SIGNIFICANT CHANGE UP (ref 0–0)
PH UR: 6.5 — SIGNIFICANT CHANGE UP (ref 5–8)
PLATELET # BLD AUTO: 240 K/UL — SIGNIFICANT CHANGE UP (ref 130–400)
POTASSIUM SERPL-MCNC: 4.6 MMOL/L — SIGNIFICANT CHANGE UP (ref 3.5–5)
POTASSIUM SERPL-SCNC: 4.6 MMOL/L — SIGNIFICANT CHANGE UP (ref 3.5–5)
PROT SERPL-MCNC: 7 G/DL — SIGNIFICANT CHANGE UP (ref 6–8)
PROT UR-MCNC: NEGATIVE — SIGNIFICANT CHANGE UP
RBC # BLD: 4.81 M/UL — SIGNIFICANT CHANGE UP (ref 4.2–5.4)
RBC # FLD: 13.6 % — SIGNIFICANT CHANGE UP (ref 11.5–14.5)
RBC CASTS # UR COMP ASSIST: 5 /HPF — HIGH (ref 0–4)
RSV RNA NPH QL NAA+NON-PROBE: SIGNIFICANT CHANGE UP
SARS-COV-2 RNA SPEC QL NAA+PROBE: DETECTED
SODIUM SERPL-SCNC: 139 MMOL/L — SIGNIFICANT CHANGE UP (ref 135–146)
SP GR SPEC: 1.02 — SIGNIFICANT CHANGE UP (ref 1.01–1.03)
UROBILINOGEN FLD QL: SIGNIFICANT CHANGE UP
WBC # BLD: 7.54 K/UL — SIGNIFICANT CHANGE UP (ref 4.8–10.8)
WBC # FLD AUTO: 7.54 K/UL — SIGNIFICANT CHANGE UP (ref 4.8–10.8)
WBC UR QL: 0 /HPF — SIGNIFICANT CHANGE UP (ref 0–5)

## 2023-02-12 PROCEDURE — 70553 MRI BRAIN STEM W/O & W/DYE: CPT

## 2023-02-12 PROCEDURE — 85025 COMPLETE CBC W/AUTO DIFF WBC: CPT

## 2023-02-12 PROCEDURE — 80053 COMPREHEN METABOLIC PANEL: CPT

## 2023-02-12 PROCEDURE — 83735 ASSAY OF MAGNESIUM: CPT

## 2023-02-12 PROCEDURE — A9579: CPT

## 2023-02-12 PROCEDURE — 70450 CT HEAD/BRAIN W/O DYE: CPT | Mod: 26,MA

## 2023-02-12 PROCEDURE — 36415 COLL VENOUS BLD VENIPUNCTURE: CPT

## 2023-02-12 PROCEDURE — 74177 CT ABD & PELVIS W/CONTRAST: CPT | Mod: 26,MA

## 2023-02-12 PROCEDURE — 85652 RBC SED RATE AUTOMATED: CPT

## 2023-02-12 PROCEDURE — 70546 MR ANGIOGRAPH HEAD W/O&W/DYE: CPT

## 2023-02-12 PROCEDURE — 71045 X-RAY EXAM CHEST 1 VIEW: CPT | Mod: 26

## 2023-02-12 RX ORDER — SODIUM CHLORIDE 9 MG/ML
1000 INJECTION, SOLUTION INTRAVENOUS ONCE
Refills: 0 | Status: COMPLETED | OUTPATIENT
Start: 2023-02-12 | End: 2023-02-12

## 2023-02-12 RX ORDER — MORPHINE SULFATE 50 MG/1
4 CAPSULE, EXTENDED RELEASE ORAL ONCE
Refills: 0 | Status: DISCONTINUED | OUTPATIENT
Start: 2023-02-12 | End: 2023-02-12

## 2023-02-12 RX ORDER — ACETAMINOPHEN 500 MG
975 TABLET ORAL ONCE
Refills: 0 | Status: COMPLETED | OUTPATIENT
Start: 2023-02-12 | End: 2023-02-12

## 2023-02-12 RX ORDER — METHOCARBAMOL 500 MG/1
1000 TABLET, FILM COATED ORAL ONCE
Refills: 0 | Status: COMPLETED | OUTPATIENT
Start: 2023-02-12 | End: 2023-02-12

## 2023-02-12 RX ORDER — DEXAMETHASONE 0.5 MG/5ML
10 ELIXIR ORAL ONCE
Refills: 0 | Status: COMPLETED | OUTPATIENT
Start: 2023-02-12 | End: 2023-02-12

## 2023-02-12 RX ORDER — CLONAZEPAM 1 MG
2 TABLET ORAL
Qty: 0 | Refills: 0 | DISCHARGE

## 2023-02-12 RX ORDER — MAGNESIUM SULFATE 500 MG/ML
2 VIAL (ML) INJECTION ONCE
Refills: 0 | Status: COMPLETED | OUTPATIENT
Start: 2023-02-12 | End: 2023-02-12

## 2023-02-12 RX ORDER — KETOROLAC TROMETHAMINE 30 MG/ML
15 SYRINGE (ML) INJECTION ONCE
Refills: 0 | Status: DISCONTINUED | OUTPATIENT
Start: 2023-02-12 | End: 2023-02-12

## 2023-02-12 RX ORDER — METOCLOPRAMIDE HCL 10 MG
10 TABLET ORAL ONCE
Refills: 0 | Status: COMPLETED | OUTPATIENT
Start: 2023-02-12 | End: 2023-02-12

## 2023-02-12 RX ADMIN — Medication 104 MILLIGRAM(S): at 14:05

## 2023-02-12 RX ADMIN — METHOCARBAMOL 1000 MILLIGRAM(S): 500 TABLET, FILM COATED ORAL at 19:34

## 2023-02-12 RX ADMIN — MORPHINE SULFATE 4 MILLIGRAM(S): 50 CAPSULE, EXTENDED RELEASE ORAL at 13:49

## 2023-02-12 RX ADMIN — Medication 15 MILLIGRAM(S): at 17:07

## 2023-02-12 RX ADMIN — SODIUM CHLORIDE 1000 MILLILITER(S): 9 INJECTION, SOLUTION INTRAVENOUS at 13:30

## 2023-02-12 RX ADMIN — Medication 25 GRAM(S): at 17:06

## 2023-02-12 RX ADMIN — Medication 102 MILLIGRAM(S): at 17:06

## 2023-02-12 RX ADMIN — MORPHINE SULFATE 4 MILLIGRAM(S): 50 CAPSULE, EXTENDED RELEASE ORAL at 23:57

## 2023-02-12 NOTE — CONSULT NOTE ADULT - NS ATTEND AMEND GEN_ALL_CORE FT
I visited the patient on 2/13. 53 yo female pmhx of anxiety recent covid feb 2/2023 p/w intermittent positional HA, since COVID, throbbing in nature mostly in the fronto-posterior region with photo/photophobia, blurriness, and dizziness. Overnight MRI brain was unremarkable and MRV showed no thrombosis. Suspect cervicogenic headache vs tension headache. For inpatient management could continue Fioricet PRn and add tizanidine 4 mg qhs. Also could try IV magnesium, Reglan, Benadryl and Toradol every 8 hours PRN. IV Solumedrol 250 mg and Depakote 25 mg for three doses each are the last options. Recommend outpatient PT for neck pain and warm compress.

## 2023-02-12 NOTE — ED PROVIDER NOTE - CLINICAL SUMMARY MEDICAL DECISION MAKING FREE TEXT BOX
52-year-old female with current COVID diagnosis presented for evaluation of headache for the past 2 weeks and right-sided abdominal pain for the past 5 days.  No fever, no shortness of breath and no chest pain.  Labs and imaging submitted in ED and reviewed, no acute pathology found, analgesia and fluids were provided, patient still complained of headache, neurology consult was obtained, management discussed with neurology service, admission for further work-up was advised.  Patient is amenable with the plan.

## 2023-02-12 NOTE — ED PROVIDER NOTE - ATTENDING APP SHARED VISIT CONTRIBUTION OF CARE
52-year-old female PMH anxiety, obesity presented for evaluation of right-sided abdominal pain for the past 5 days, headaches for about 2 weeks.  Patient was seen in urgent care, prescribed medications for cough and abx.  She denies any fever, cough, shortness of breath or chest pain, no urinary complaints, no diarrhea, no black or bloody stools. Middle-aged female resting on stretcher in no acute distress, PERRL, pink conjunctiva, MMM, no mastoid TTP, no meningeal signs, speaking full sentences, lungs clear to auscultation bilaterally, no midline spine or CVA TTP, abdomen is nondistended, mostly right-sided abdominal tenderness to palpation without rebound or guarding, no leg edema, no skin rash, patient is awake and alert x3, no gross neurodeficits.  Plan: Labs, imaging, analgesia, reassess.  Patient is amenable with the plan.

## 2023-02-12 NOTE — ED PROVIDER NOTE - PHYSICAL EXAMINATION
CONSTITUTIONAL: non-toxic appearing female, no acute distress  SKIN: skin exam is warm and dry  HEAD: Normocephalic; atraumatic  EYES: PERRL, EOM intact; conjunctiva and sclera clear.  ENT: MMM  NECK: ROM intact, No nuchal rigidity   CARD: S1, S2 normal, no murmur  RESP: Good air movement bilaterally  ABD: soft; non-distended, RLQ TTP, no rebound/guarding. no CVAT   EXT: Normal ROM  NEURO: awake, alert, following commands, oriented, grossly unremarkable. No Focal deficits. GCS 15.   PSYCH: Cooperative, appropriate. CONSTITUTIONAL: non-toxic appearing female, no acute distress  SKIN: skin exam is warm and dry  HEAD: Normocephalic; atraumatic  EYES: PERRL, EOM intact; conjunctiva and sclera clear. IOP R-8 L-9.  ENT: MMM  NECK: ROM intact, No nuchal rigidity   CARD: S1, S2 normal, no murmur  RESP: Good air movement bilaterally  ABD: soft; non-distended, RLQ TTP, no rebound/guarding. no CVAT   EXT: Normal ROM  NEURO: awake, alert, following commands, oriented, grossly unremarkable. No Focal deficits. GCS 15.   PSYCH: Cooperative, appropriate.

## 2023-02-12 NOTE — ED PROVIDER NOTE - DIFFERENTIAL DIAGNOSIS
Viral syndrome, rule out abdominal pathology such as appendicitis, diverticulitis, less likely renal or ovarian pathology. Differential Diagnosis

## 2023-02-12 NOTE — H&P ADULT - ATTENDING COMMENTS
PHYSICAL EXAM:  General Appearance: NAD, normal for age and gender. 	  Neck: Normal JVP, no bruit. Tenderness over posterior neck and scalp.  Eyes: Conjunctiva clear, Extra Ocular muscles intact. No scleral icterus.  ENMT: Moist oral mucosa. No oral lesion.  Cardiovascular: Regular rate and rhythm S1 S2, No JVD, No murmurs.  Respiratory: Lungs clear to auscultation. No wheezes, rales or rhonchi.  Psychiatry: Alert and oriented x 3, Mood & affect appropriate.  Gastrointestinal:  Soft, Non-tender, Non-distended. RLQ tenderness.  Neurologic: Non-focal deficits.  Musculoskeletal/ extremities: Normal range of motion, No clubbing, cyanosis or edema.  Vascular: Peripheral pulses palpable bilaterally.  Skin/Integumen: No rashes, No ecchymoses, No cyanosis.    # Headache, eye blurriness and glaring rule out acute cerebral etiology including cerebral venous thrombosis vs. no onset migraine vs. 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 that improved with IV fluids and toradol  - has RLQ tenderness to palpation  - CTAP - no evidence of acute/inflammatory process in the abd or pelvis  - continue monitoring    # Smoking cessation  - pt smokes 2 cigs for 27 years and expressed she would like to stop  - start nicotine patch    # COVID-19, tested positive 2/2/23  - pt has picture of documentation from urgent care  - now past 10 day isolation period, no further need for isolation    #  Anxiety disorder  - takes clonazepam 2mg q6h prn at home  - previously took 1 a day but when her mother passed away 11/10/22 she started taking 3 a day  - has not taken any clonazepam in the last 3 weeks, will monitor for withdrawal symptoms    # DVT prophylaxis - on lovenox subcut  # Code status - Full Code

## 2023-02-12 NOTE — ED ADULT NURSE NOTE - NSFALLRSKASSESASSIST_ED_ALL_ED
Prenatal Care Visit    Subjective   Chief Complaint   Patient presents with   • Routine Prenatal Visit     Anatomy scan done today, no complaints.       History:   Summer is a  currently at 21w1d who presents for a prenatal care visit today.    No major issues.    Social History    Tobacco Use      Smoking status: Former Smoker        Types: Cigarettes      Smokeless tobacco: Never Used      Tobacco comment: 4-5 cig/day       Objective   /60   Wt 68.9 kg (152 lb)   BMI 26.93 kg/m²   Physical Exam:  Normal, gestational age-appropriate exam today        Plan   Medical Decision Making:    I have reviewed the prenatal labs and ultrasound(s) today. I have reviewed the most recent prenatal progress note(s).    Diagnosis: Supervision of low risk pregnancy   Tests/Orders/Rx today: No orders of the defined types were placed in this encounter.      Medication Management: None     Topics discussed: Prenatal care milestones  U/S findings   NIPS NEG   Tests next visit: GCT  HgB   Next visit: 4 week(s)     Elmer Gaitan MD  Obstetrics and Gynecology  [facility]    
no

## 2023-02-12 NOTE — ED PROVIDER NOTE - PROGRESS NOTE DETAILS
patient endorsed episode of bloody stool when wiping. refuses rectal exam denies lightheadedness. patient evaluated by neurology, recommend admission. patient /60 with persistent headache. will admit

## 2023-02-12 NOTE — H&P ADULT - NSHPPHYSICALEXAM_GEN_ALL_CORE
GENERAL: NAD, well-groomed, well-developed  HEAD:  Atraumatic, Normocephalic  NECK: Supple  NERVOUS SYSTEM:  Alert & Oriented X3, Good concentration; moves all 4 extremities   CHEST/LUNG: Clear to auscultation bilaterally; No rales, rhonchi, wheezing, or rubs  HEART: Regular rate and rhythm. Normal S1/S1. No murmurs, rubs, or gallops  ABDOMEN: RLQ/R groin TTP, Soft, Nontender; Bowel sounds present.  EXTREMITIES: no edema in BLE, sensation intact to B/L sp/dp/t

## 2023-02-12 NOTE — CONSULT NOTE ADULT - ASSESSMENT
51 yo female pmhx of anxiety recent covid feb 2/2023 p/w intermittent positional HA, since COVID, throbbing in nature mostly in the fronto-posterior region with photo/photophobia, blurriness, and dizziness. Worse during day and better at night. Patient denies fever, chills, n/v, neck pain, worst headache of her life, focal weakness or other complaints. Patient does endorse abdominal pain and diarrhea.  on Exam: pt AOX3 no focal deficits. CTH negative for acute findings. Etiology may be related to new onset migraine r/o cerebral venous thrombosis, headaches maybe related to COVID.       Recommendations:     MRI Brain w/and w/o  MRV w/and w/o  Fioricet 1 tab tid prn  Fall precaution   Check ESR level   Medical management of elevated BP   Case discussed with Dr. Cordova   51 yo female pmhx of anxiety recent covid feb 2/2023 p/w intermittent positional HA, since COVID, throbbing in nature mostly in the fronto-posterior region with photo/photophobia, blurriness, and dizziness. Worse during day and better at night. Patient denies fever, chills, n/v, neck pain, worst headache of her life, focal weakness or other complaints. Patient does endorse abdominal pain and diarrhea.  on Exam: pt AOX3 no focal deficits. CTH negative for acute findings. Etiology may be related to new onset migraine r/o cerebral venous thrombosis, headaches maybe related to COVID.       Recommendations:     MRI Brain w/and w/o if not contraindicated ( pt states has metal plate in right arm)  MRV w/and w/o  Fioricet 1 tab tid prn  Fall precaution   Check ESR level   Medical management of elevated BP   Case discussed with Dr. Cordova

## 2023-02-12 NOTE — H&P ADULT - ASSESSMENT
53yo F w/ PMH of anxiety and h/o recurrent UTI (last was 8 months ago) presents for worsening headache and lower abdominal pain. The headache started when she tested positive for COVID on 2/2/23 and abd pain started 5 days prior to admission.     # Headache  - Neuro on board, saw the pt in the ED        # Abdominal pain      DVT ppx: lovenox  Diet: Regular  Activity: AAT  Code: Full 53yo F w/ PMH of anxiety and h/o recurrent UTI (last was 8 months ago) presents for worsening headache and lower abdominal pain. The headache started when she tested positive for COVID on 2/2/23 and abd pain started 5 days prior to admission.     # Headache  - Neuro on board, saw the pt in the ED  - Could be related to COVID, tested positive 2/2/23  - Would need to r/o new onset migraine, cerebral venous thrombosis  - MRI Brain w/wo   - MRV w/wo  - Had ORIF wrist 8/24/22, had MRI 11/11/22, no issue with current ortho hardware  - Check ESR  - Management of BP, SBP was 182, improved to 142  - Fioricet 1 tab TID prn (nonformulary)    # Abdominal pain  - Has h/o interstitial edematous pancreatitis 12/2021 that improved with IV fluids and toradol  - Has RLQ tenderness to palpation  - Now improved after morphine in the ED  - Monitor for now    # Smoking cessation  - Pt smokes 2 cigs for 27 years and expressed she would like to stop  - Start nicotine patch    # COVID  - Tested positive 2/2/23  - Pt has picture of documentation from urgent care  - Now past 10 day isolation period, no further need for isolation    DVT ppx: lovenox  Diet: Regular  Activity: AAT  Code: Full 51yo F w/ PMH of anxiety and h/o recurrent UTI (last was 8 months ago) presents for worsening headache and lower abdominal pain. The headache started when she tested positive for COVID on 2/2/23 and abd pain started 5 days prior to admission.     # Headache  - Neuro on board, saw the pt in the ED  - Could be related to COVID, tested positive 2/2/23  - Would need to r/o new onset migraine, cerebral venous thrombosis  - CTH - no acute intracranial pathology  - MRI Brain w/wo   - MRV w/wo  - Had ORIF wrist 8/24/22, had MRI 11/11/22, no issue with current ortho hardware  - Check ESR  - Management of BP, SBP was 182, improved to 142  - Fioricet 1 tab TID prn (nonformulary)    # Abdominal pain  - Has h/o interstitial edematous pancreatitis 12/2021 that improved with IV fluids and toradol  - Has RLQ tenderness to palpation  - CTAP - no evidence of acute/inflammatory process in the abd or pelvis  - Now improved  - Monitor for now    # Smoking cessation  - Pt smokes 2 cigs for 27 years and expressed she would like to stop  - Start nicotine patch    # COVID  - Tested positive 2/2/23  - Pt has picture of documentation from urgent care  - Now past 10 day isolation period, no further need for isolation    DVT ppx: lovenox  Diet: Regular  Activity: AAT  Code: Full 53yo F w/ PMH of anxiety and h/o recurrent UTI (last was 8 months ago) presents for worsening headache and lower abdominal pain. The headache started when she tested positive for COVID on 2/2/23 and abd pain started 5 days prior to admission.     # Headache  - Neuro on board, saw the pt in the ED  - Could be related to COVID, tested positive 2/2/23  - Would need to r/o new onset migraine, cerebral venous thrombosis  - CTH - no acute intracranial pathology  - MRI Brain w/wo   - MRV w/wo  - Had ORIF wrist 8/24/22, had MRI 11/11/22, no issue with current ortho hardware  - Check ESR  - Management of BP, SBP was 182, improved to 142  - Fioricet 1 tab TID prn (nonformulary)  - Worsens when sitting/standing up, obtain orthostatic vitals    # Abdominal pain  - Has h/o interstitial edematous pancreatitis 12/2021 that improved with IV fluids and toradol  - Has RLQ tenderness to palpation  - CTAP - no evidence of acute/inflammatory process in the abd or pelvis  - Now improved  - Monitor for now    # Smoking cessation  - Pt smokes 2 cigs for 27 years and expressed she would like to stop  - Start nicotine patch    # COVID  - Tested positive 2/2/23  - Pt has picture of documentation from urgent care  - Now past 10 day isolation period, no further need for isolation    DVT ppx: lovenox  Diet: Regular  Activity: AAT  Code: Full 53yo F w/ PMH of anxiety and h/o recurrent UTI (last was 8 months ago) presents for worsening headache and lower abdominal pain. The headache started when she tested positive for COVID on 2/2/23 and abd pain started 5 days prior to admission.     # Headache  - Neuro on board, saw the pt in the ED  - Could be related to COVID, tested positive 2/2/23  - Would need to r/o new onset migraine, cerebral venous thrombosis  - CTH - no acute intracranial pathology  - MRI Brain w/wo   - MRV w/wo  - Had ORIF wrist 8/24/22, had MRI 11/11/22, no issue with current ortho hardware  - Check ESR  - Management of BP, SBP was 182, improved to 142  - Fioricet 1 tab TID prn (nonformulary)  - Worsens when sitting/standing up, obtain orthostatic vitals    # Abdominal pain  - Has h/o interstitial edematous pancreatitis 12/2021 that improved with IV fluids and toradol  - Has RLQ tenderness to palpation  - CTAP - no evidence of acute/inflammatory process in the abd or pelvis  - Now improved  - Monitor for now    # Smoking cessation  - Pt smokes 2 cigs for 27 years and expressed she would like to stop  - Start nicotine patch    # COVID  - Tested positive 2/2/23  - Pt has picture of documentation from urgent care  - Now past 10 day isolation period, no further need for isolation    # Anxiety  - Takes clonazepam 2mg q6h prn at home  - Previously took 1 a day but when her mother passed away 11/10/22 she started taking 3 a day  - Has not taken any clonazepam in the last 3 weeks    DVT ppx: lovenox  Diet: Regular  Activity: AAT  Code: Full

## 2023-02-12 NOTE — ED PROVIDER NOTE - OBJECTIVE STATEMENT
52 year old female, no past medical history, who presents for eval. patient covid +2/2 with symptoms progressively worsening prompting ed eval. patient with gradual onset of frontal headache radiating to neck and RLQ pain that progressively worsened. patient evaluated @ urgent care x2, started on tessalon perles, prednisone and augmentin w/o improvement. denies fever, tinnitus, nausea/vomiting, back pain, urinary symptoms, bowel changes. no hx abd surgeries.

## 2023-02-12 NOTE — H&P ADULT - NSHPLABSRESULTS_GEN_ALL_CORE
LABS:  cret                        14.7   7.54  )-----------( 240      ( 12 Feb 2023 13:22 )             45.2     02-12    139  |  103  |  21<H>  ----------------------------<  94  4.6   |  30  |  0.7    Ca    9.4      12 Feb 2023 13:22  Mg     2.0     02-12    TPro  7.0  /  Alb  4.4  /  TBili  0.3  /  DBili  x   /  AST  14  /  ALT  13  /  AlkPhos  78  02-12 LABS:  cret                        14.7   7.54  )-----------( 240      ( 12 Feb 2023 13:22 )             45.2     02-12    139  |  103  |  21<H>  ----------------------------<  94  4.6   |  30  |  0.7    Ca    9.4      12 Feb 2023 13:22  Mg     2.0     02-12    TPro  7.0  /  Alb  4.4  /  TBili  0.3  /  DBili  x   /  AST  14  /  ALT  13  /  AlkPhos  78  02-12    < from: CT Abdomen and Pelvis w/ IV Cont (02.12.23 @ 18:00) >  IMPRESSION:  No evidence of acute/inflammatory process in the abdomen or pelvis.    < from: CT Head No Cont (02.12.23 @ 14:15) >  IMPRESSION:  No acute intracranial pathology, stable exam since 12/15/2021.

## 2023-02-12 NOTE — CONSULT NOTE ADULT - SUBJECTIVE AND OBJECTIVE BOX
Neurology Consult    Patient is a 52y old  Female who presents with a chief complaint of     HPI:  51 yo female pmhx of anxiety recent covid 2023 p/w intermittent positional HA, since COVID, throbbing in nature mostly in the fronto-posterior region with photo/photophobia, blurriness, and dizziness. Worse during day and better at night. Patient denies fever, chills, n/v, neck pain, worst headache of her life, focal weakness or other complaints. Patient does endorse abdominal pain and diarrhea.      PAST MEDICAL & SURGICAL HISTORY:  Anxiety  Wrist fracture  No significant past surgical history      Social History:   Patient is a smoker for past several years approx 1ppd, currently on Chantix smokes 2 cigarettes a day since on chantix  Denies ETOH abuse  Denies Drug abuse      Home Medications:  ConazePAM 2 mg oral tablet: 2 milligram(s) orally once a day (at bedtime) (24 Aug 2022 07:51)  Iuprofen 400 mg oral tablet: 1 tab(s) orally every 6 hours, As Needed pain (24 Aug 2022 07:51)    Allergies    No Known Allergies      Vital Signs Last 24 Hrs  T(C): 36.9 (2023 12:03), Max: 36.9 (2023 12:03)  T(F): 98.5 (2023 12:03), Max: 98.5 (2023 12:03)  HR: 80 (2023 12:03) (80 - 80)  BP: 182/82 (2023 12:03) (182/82 - 182/82)  BP(mean): --  RR: 18 (2023 12:03) (18 - 18)  SpO2: 97% (2023 12:03) (97% - 97%)          Examination:  General:  Appearance is consistent with chronologic age.  No abnormal facies.  Gross skin survey within normal limits.    Cognitive/Language:  The patient is oriented to person, place, time and date.  Fund of knowledge is intact and normal.  Nondysarthric.    Eyes: intact VA, VFF.  EOMI w/o nystagmus,  PERRL.  No ptosis/weakness of eyelid closure.    Face:  Facial sensation normal V1 - 3, no facial asymmetry.    Ears/Nose/Throat:  Hearing grossly intact b/l. Tongue and uvula midline.   Motor examination:   Normal tone, bulk and range of motion.  No tenderness, twitching, tremors or involuntary movements.  Formal Muscle Strength Testing: (MRC grade R/L) 5/5 UE; 5/5 LE.  No observable drift.  Reflexes:   2+ b/l pectoralis, biceps, triceps, brachioradialis, patella and Achilles.  Plantar response downgoing b/l. clonus absent, negative Babinski.  Sensory examination:   Intact to light touch and pinprick, pain, in all extremities.  Cerebellum:   FTN intact with normal CRISTY in all limbs.  No dysmetria or dysdiadokinesia.  Gait narrow based and normal.    Respiratory:  no audible wheezing or inspiratory stridor.  no use of accessory muscles.   Cardiac: pulse palpable, no audible bruits  Abdomen: supple, no guarding, no TTP    Labs:   CBC Full  -  ( 2023 13:22 )  WBC Count : 7.54 K/uL  RBC Count : 4.81 M/uL  Hemoglobin : 14.7 g/dL  Hematocrit : 45.2 %  Platelet Count - Automated : 240 K/uL  Mean Cell Volume : 94.0 fL  Mean Cell Hemoglobin : 30.6 pg  Mean Cell Hemoglobin Concentration : 32.5 g/dL  Auto Neutrophil # : 4.77 K/uL  Auto Lymphocyte # : 2.12 K/uL  Auto Monocyte # : 0.48 K/uL  Auto Eosinophil # : 0.12 K/uL  Auto Basophil # : 0.03 K/uL  Auto Neutrophil % : 63.2 %  Auto Lymphocyte % : 28.1 %  Auto Monocyte % : 6.4 %  Auto Eosinophil % : 1.6 %  Auto Basophil % : 0.4 %        139  |  103  |  21<H>  ----------------------------<  94  4.6   |  30  |  0.7    Ca    9.4      2023 13:22  Mg     2.0         TPro  7.0  /  Alb  4.4  /  TBili  0.3  /  DBili  x   /  AST  14  /  ALT  13  /  AlkPhos  78  -12    LIVER FUNCTIONS - ( 2023 13:22 )  Alb: 4.4 g/dL / Pro: 7.0 g/dL / ALK PHOS: 78 U/L / ALT: 13 U/L / AST: 14 U/L / GGT: x             Urinalysis Basic - ( 2023 14:50 )    Color: Light Yellow / Appearance: Clear / S.016 / pH: x  Gluc: x / Ketone: Negative  / Bili: Negative / Urobili: <2 mg/dL   Blood: x / Protein: Negative / Nitrite: Negative   Leuk Esterase: Negative / RBC: 5 /HPF / WBC 0 /HPF   Sq Epi: x / Non Sq Epi: 2 /HPF / Bacteria: Negative          Neuroimaging:  NCHCT: CT Head No Cont:   ACC: [id number] EXAM:  CT BRAIN   ORDERED BY: TERRIE CARLIN     PROCEDURE DATE:  2023          INTERPRETATION:  CLINICAL INDICATION: New onset migraine.    TECHNIQUE: CT of the head was performed without the administration of   intravenous contrast.    COMPARISON: CT head dated 12/15/2021.    FINDINGS:    There is no evidence of acute territorial infarct or intracranial   hemorrhage. There is no space-occupying lesion or midline shift.    There is no evidence of hydrocephalus. There are no extra-axial fluid   collections.    The visualized intraorbital contents are normal. Trace mucosal thickening   in bilateral ethmoid sinuses. The mastoid air cells are aerated. The   visualized soft tissues and osseous structures appear normal.      IMPRESSION:    No acute intracranial pathology, stable exam since 12/15/2021.    --- End of Report ---            TESFAYE STEVENSON MD; Attending Radiologist  This document has been electronically signed. 2023  2:47PM (23 @ 14:15)      23 @ 22:24       Neurology Consult    Patient is a 52y old  Female who presents with a chief complaint of     HPI:  53 yo female pmhx of anxiety recent covid 2023 p/w intermittent positional HA, since COVID, throbbing in nature mostly in the fronto-posterior region with photo/photophobia, blurriness, and dizziness. Worse during day and better at night. Patient denies fever, chills, n/v, neck pain, worst headache of her life, focal weakness or other complaints. Patient does endorse abdominal pain and diarrhea.      PAST MEDICAL & SURGICAL HISTORY:  Anxiety  Wrist fracture  No significant past surgical history      Social History:   Patient is a smoker for past several years approx 1ppd, currently on Chantix smokes 2 cigarettes a day since on chantix  Denies ETOH abuse  Denies Drug abuse      Home Medications:  ConazePAM 2 mg oral tablet: 2 milligram(s) orally once a day (at bedtime) (24 Aug 2022 07:51)  Iuprofen 400 mg oral tablet: 1 tab(s) orally every 6 hours, As Needed pain (24 Aug 2022 07:51)    Allergies    No Known Allergies      Vital Signs Last 24 Hrs  T(C): 36.9 (2023 12:03), Max: 36.9 (2023 12:03)  T(F): 98.5 (2023 12:03), Max: 98.5 (2023 12:03)  HR: 80 (2023 12:03) (80 - 80)  BP: 182/82 (2023 12:03) (182/82 - 182/82)  BP(mean): --  RR: 18 (2023 12:03) (18 - 18)  SpO2: 97% (2023 12:03) (97% - 97%)          Examination:  General:  Appearance is consistent with chronologic age.  No abnormal facies.  Gross skin survey within normal limits.    Cognitive/Language:  The patient is oriented to person, place, time and date.  Fund of knowledge is intact and normal.  Nondysarthric.    Eyes: intact VA, VFF.  EOMI w/o nystagmus,  PERRL.  No ptosis/weakness of eyelid closure.    Face:  Facial sensation normal V1 - 3, no facial asymmetry.    Ears/Nose/Throat:  Hearing grossly intact b/l. Tongue and uvula midline.   Motor examination:   Normal tone, bulk and range of motion.  No tenderness, twitching, tremors or involuntary movements.  Formal Muscle Strength Testing: (MRC grade R/L) 5/5 UE; 5/5 LE.  No observable drift.  Reflexes:   2+ b/l pectoralis, biceps, triceps, brachioradialis, patella and Achilles.  Plantar response downgoing b/l. clonus absent, negative Babinski.  Sensory examination:   Intact to light touch and pinprick, pain, in all extremities.  Cerebellum:   FTN intact with normal CRISTY in all limbs.  No dysmetria or dysdiadokinesia.   Gait narrow based and normal.        Labs:   CBC Full  -  ( 2023 13:22 )  WBC Count : 7.54 K/uL  RBC Count : 4.81 M/uL  Hemoglobin : 14.7 g/dL  Hematocrit : 45.2 %  Platelet Count - Automated : 240 K/uL  Mean Cell Volume : 94.0 fL  Mean Cell Hemoglobin : 30.6 pg  Mean Cell Hemoglobin Concentration : 32.5 g/dL  Auto Neutrophil # : 4.77 K/uL  Auto Lymphocyte # : 2.12 K/uL  Auto Monocyte # : 0.48 K/uL  Auto Eosinophil # : 0.12 K/uL  Auto Basophil # : 0.03 K/uL  Auto Neutrophil % : 63.2 %  Auto Lymphocyte % : 28.1 %  Auto Monocyte % : 6.4 %  Auto Eosinophil % : 1.6 %  Auto Basophil % : 0.4 %    12    139  |  103  |  21<H>  ----------------------------<  94  4.6   |  30  |  0.7    Ca    9.4      2023 13:22  Mg     2.0         TPro  7.0  /  Alb  4.4  /  TBili  0.3  /  DBili  x   /  AST  14  /  ALT  13  /  AlkPhos  78  02-12    LIVER FUNCTIONS - ( 2023 13:22 )  Alb: 4.4 g/dL / Pro: 7.0 g/dL / ALK PHOS: 78 U/L / ALT: 13 U/L / AST: 14 U/L / GGT: x             Urinalysis Basic - ( 2023 14:50 )    Color: Light Yellow / Appearance: Clear / S.016 / pH: x  Gluc: x / Ketone: Negative  / Bili: Negative / Urobili: <2 mg/dL   Blood: x / Protein: Negative / Nitrite: Negative   Leuk Esterase: Negative / RBC: 5 /HPF / WBC 0 /HPF   Sq Epi: x / Non Sq Epi: 2 /HPF / Bacteria: Negative          Neuroimaging:  NCHCT: CT Head No Cont:   ACC: [id number] EXAM:  CT BRAIN   ORDERED BY: TERRIE CARLIN     PROCEDURE DATE:  2023          INTERPRETATION:  CLINICAL INDICATION: New onset migraine.    TECHNIQUE: CT of the head was performed without the administration of   intravenous contrast.    COMPARISON: CT head dated 12/15/2021.    FINDINGS:    There is no evidence of acute territorial infarct or intracranial   hemorrhage. There is no space-occupying lesion or midline shift.    There is no evidence of hydrocephalus. There are no extra-axial fluid   collections.    The visualized intraorbital contents are normal. Trace mucosal thickening   in bilateral ethmoid sinuses. The mastoid air cells are aerated. The   visualized soft tissues and osseous structures appear normal.      IMPRESSION:    No acute intracranial pathology, stable exam since 12/15/2021.    --- End of Report ---            TESFAYE STEVENSON MD; Attending Radiologist  This document has been electronically signed. 2023  2:47PM (23 @ 14:15)      23 @ 22:24

## 2023-02-12 NOTE — H&P ADULT - HISTORY OF PRESENT ILLNESS
53yo F w/ PMH of anxiety and h/o recurrent UTI (last was 8 months ago) presents for worsening headache and lower abdominal pain. The headaches started when she first tested positive for COVID on 2/2/23. She noted she had symptoms since 1/31/23 but did not test until 2/2/23. She was also tested for flu which was negative. Her RLQ abdominal pain started 5 days ago. She describes her headache as throbbing in the fronto-posterior region that occasionally causes blurriness in her eyes. She said the headache is worsened by loud sounds and when sitting/standing up. There is slight relief when lying down. There is no improvement if she goes to a dark and quiet place. She has also tried several over the counter tylenol and nsaids without any improvement. In the ED, she said only morphine gave her relief of her abdominal pain and only slight relief of the headache, which remains a 10/10 in pain. The patient went to urgent care twice, 2/4/23 and 2/10/23. On the first visit, they gave her prednisone, benzonatate, and fluticasone. On the second visit she was prescribed augmentin and Brompheniramine-dextromethorphan-pseudoephedrine syrup. She said none of these medications helped. She denies fever, chills, nausea, vomiting, chest pain, trouble breathing. She says she does have headache and abdominal pain.     In the ED  Vitals T98.5, /82 (later improved to  not documented without improvement of headhache), HR 80, RR 18, Spo2 97% on RA  Labs were unremarkable. UA negative. COVID +  CTH - no acute intracranial pathology  CTAP - no evidence of acute/inflammatory process in the abd or pelvis    She was given 1L LR, tylenol, dexamethasone, toradol, magnesium, methocarbamol, metoclopramide, and morphine in the ED.    Patient was admitted for headhache

## 2023-02-13 LAB
ALBUMIN SERPL ELPH-MCNC: 4.5 G/DL — SIGNIFICANT CHANGE UP (ref 3.5–5.2)
ALP SERPL-CCNC: 82 U/L — SIGNIFICANT CHANGE UP (ref 30–115)
ALT FLD-CCNC: 13 U/L — SIGNIFICANT CHANGE UP (ref 0–41)
ANION GAP SERPL CALC-SCNC: 12 MMOL/L — SIGNIFICANT CHANGE UP (ref 7–14)
AST SERPL-CCNC: 14 U/L — SIGNIFICANT CHANGE UP (ref 0–41)
BASOPHILS # BLD AUTO: 0.01 K/UL — SIGNIFICANT CHANGE UP (ref 0–0.2)
BASOPHILS NFR BLD AUTO: 0.1 % — SIGNIFICANT CHANGE UP (ref 0–1)
BILIRUB SERPL-MCNC: 0.3 MG/DL — SIGNIFICANT CHANGE UP (ref 0.2–1.2)
BUN SERPL-MCNC: 22 MG/DL — HIGH (ref 10–20)
CALCIUM SERPL-MCNC: 9.7 MG/DL — SIGNIFICANT CHANGE UP (ref 8.4–10.5)
CHLORIDE SERPL-SCNC: 102 MMOL/L — SIGNIFICANT CHANGE UP (ref 98–110)
CO2 SERPL-SCNC: 26 MMOL/L — SIGNIFICANT CHANGE UP (ref 17–32)
CREAT SERPL-MCNC: 0.7 MG/DL — SIGNIFICANT CHANGE UP (ref 0.7–1.5)
CULTURE RESULTS: NO GROWTH — SIGNIFICANT CHANGE UP
EGFR: 103 ML/MIN/1.73M2 — SIGNIFICANT CHANGE UP
EOSINOPHIL # BLD AUTO: 0.01 K/UL — SIGNIFICANT CHANGE UP (ref 0–0.7)
EOSINOPHIL NFR BLD AUTO: 0.1 % — SIGNIFICANT CHANGE UP (ref 0–8)
ERYTHROCYTE [SEDIMENTATION RATE] IN BLOOD: 4 MM/HR — SIGNIFICANT CHANGE UP (ref 0–20)
GLUCOSE SERPL-MCNC: 123 MG/DL — HIGH (ref 70–99)
HCT VFR BLD CALC: 41.6 % — SIGNIFICANT CHANGE UP (ref 37–47)
HGB BLD-MCNC: 14 G/DL — SIGNIFICANT CHANGE UP (ref 12–16)
IMM GRANULOCYTES NFR BLD AUTO: 0.4 % — HIGH (ref 0.1–0.3)
LYMPHOCYTES # BLD AUTO: 1.13 K/UL — LOW (ref 1.2–3.4)
LYMPHOCYTES # BLD AUTO: 12.3 % — LOW (ref 20.5–51.1)
MAGNESIUM SERPL-MCNC: 2.2 MG/DL — SIGNIFICANT CHANGE UP (ref 1.8–2.4)
MCHC RBC-ENTMCNC: 30.6 PG — SIGNIFICANT CHANGE UP (ref 27–31)
MCHC RBC-ENTMCNC: 33.7 G/DL — SIGNIFICANT CHANGE UP (ref 32–37)
MCV RBC AUTO: 90.8 FL — SIGNIFICANT CHANGE UP (ref 81–99)
MONOCYTES # BLD AUTO: 0.38 K/UL — SIGNIFICANT CHANGE UP (ref 0.1–0.6)
MONOCYTES NFR BLD AUTO: 4.2 % — SIGNIFICANT CHANGE UP (ref 1.7–9.3)
NEUTROPHILS # BLD AUTO: 7.58 K/UL — HIGH (ref 1.4–6.5)
NEUTROPHILS NFR BLD AUTO: 82.9 % — HIGH (ref 42.2–75.2)
NRBC # BLD: 0 /100 WBCS — SIGNIFICANT CHANGE UP (ref 0–0)
PLATELET # BLD AUTO: 246 K/UL — SIGNIFICANT CHANGE UP (ref 130–400)
POTASSIUM SERPL-MCNC: 4.6 MMOL/L — SIGNIFICANT CHANGE UP (ref 3.5–5)
POTASSIUM SERPL-SCNC: 4.6 MMOL/L — SIGNIFICANT CHANGE UP (ref 3.5–5)
PROT SERPL-MCNC: 6.7 G/DL — SIGNIFICANT CHANGE UP (ref 6–8)
RBC # BLD: 4.58 M/UL — SIGNIFICANT CHANGE UP (ref 4.2–5.4)
RBC # FLD: 13.6 % — SIGNIFICANT CHANGE UP (ref 11.5–14.5)
SODIUM SERPL-SCNC: 140 MMOL/L — SIGNIFICANT CHANGE UP (ref 135–146)
SPECIMEN SOURCE: SIGNIFICANT CHANGE UP
WBC # BLD: 9.15 K/UL — SIGNIFICANT CHANGE UP (ref 4.8–10.8)
WBC # FLD AUTO: 9.15 K/UL — SIGNIFICANT CHANGE UP (ref 4.8–10.8)

## 2023-02-13 PROCEDURE — 70545 MR ANGIOGRAPHY HEAD W/DYE: CPT | Mod: 26,59

## 2023-02-13 PROCEDURE — 99222 1ST HOSP IP/OBS MODERATE 55: CPT

## 2023-02-13 PROCEDURE — 99221 1ST HOSP IP/OBS SF/LOW 40: CPT

## 2023-02-13 PROCEDURE — 70553 MRI BRAIN STEM W/O & W/DYE: CPT | Mod: 26

## 2023-02-13 RX ORDER — MORPHINE SULFATE 50 MG/1
2 CAPSULE, EXTENDED RELEASE ORAL ONCE
Refills: 0 | Status: DISCONTINUED | OUTPATIENT
Start: 2023-02-13 | End: 2023-02-13

## 2023-02-13 RX ORDER — TIZANIDINE 4 MG/1
4 TABLET ORAL ONCE
Refills: 0 | Status: COMPLETED | OUTPATIENT
Start: 2023-02-13 | End: 2023-02-13

## 2023-02-13 RX ORDER — OXYCODONE HYDROCHLORIDE 5 MG/1
5 TABLET ORAL EVERY 6 HOURS
Refills: 0 | Status: DISCONTINUED | OUTPATIENT
Start: 2023-02-13 | End: 2023-02-14

## 2023-02-13 RX ORDER — MORPHINE SULFATE 50 MG/1
2 CAPSULE, EXTENDED RELEASE ORAL EVERY 4 HOURS
Refills: 0 | Status: DISCONTINUED | OUTPATIENT
Start: 2023-02-13 | End: 2023-02-14

## 2023-02-13 RX ORDER — NICOTINE POLACRILEX 2 MG
1 GUM BUCCAL DAILY
Refills: 0 | Status: DISCONTINUED | OUTPATIENT
Start: 2023-02-13 | End: 2023-02-14

## 2023-02-13 RX ORDER — MAGNESIUM SULFATE 500 MG/ML
2 VIAL (ML) INJECTION ONCE
Refills: 0 | Status: COMPLETED | OUTPATIENT
Start: 2023-02-13 | End: 2023-02-13

## 2023-02-13 RX ORDER — ENOXAPARIN SODIUM 100 MG/ML
40 INJECTION SUBCUTANEOUS EVERY 24 HOURS
Refills: 0 | Status: DISCONTINUED | OUTPATIENT
Start: 2023-02-13 | End: 2023-02-14

## 2023-02-13 RX ORDER — SENNA PLUS 8.6 MG/1
2 TABLET ORAL AT BEDTIME
Refills: 0 | Status: DISCONTINUED | OUTPATIENT
Start: 2023-02-13 | End: 2023-02-14

## 2023-02-13 RX ADMIN — MORPHINE SULFATE 4 MILLIGRAM(S): 50 CAPSULE, EXTENDED RELEASE ORAL at 00:27

## 2023-02-13 RX ADMIN — Medication 1 PATCH: at 12:19

## 2023-02-13 RX ADMIN — MORPHINE SULFATE 2 MILLIGRAM(S): 50 CAPSULE, EXTENDED RELEASE ORAL at 09:47

## 2023-02-13 RX ADMIN — TIZANIDINE 4 MILLIGRAM(S): 4 TABLET ORAL at 15:00

## 2023-02-13 RX ADMIN — Medication 25 GRAM(S): at 15:47

## 2023-02-13 RX ADMIN — MORPHINE SULFATE 2 MILLIGRAM(S): 50 CAPSULE, EXTENDED RELEASE ORAL at 16:58

## 2023-02-13 RX ADMIN — MORPHINE SULFATE 2 MILLIGRAM(S): 50 CAPSULE, EXTENDED RELEASE ORAL at 13:14

## 2023-02-13 RX ADMIN — Medication 1 CAPSULE(S): at 05:32

## 2023-02-13 RX ADMIN — MORPHINE SULFATE 2 MILLIGRAM(S): 50 CAPSULE, EXTENDED RELEASE ORAL at 22:26

## 2023-02-13 NOTE — ED ADULT NURSE REASSESSMENT NOTE - NS ED NURSE REASSESS COMMENT FT1
Patient assessed bedside.  A/O x 4.  No S/S of acute pain or distress at this time.  Pt admitted to Telemetry awaiting bed placement.  Pt safety and comfort maintained. Patient assessed bedside.  A/O x 4.  No S/S of acute pain or distress at this time.  Pt admitted to Medicine awaiting bed placement.  Pt safety and comfort maintained.

## 2023-02-14 ENCOUNTER — TRANSCRIPTION ENCOUNTER (OUTPATIENT)
Age: 53
End: 2023-02-14

## 2023-02-14 VITALS
OXYGEN SATURATION: 98 % | RESPIRATION RATE: 18 BRPM | DIASTOLIC BLOOD PRESSURE: 76 MMHG | SYSTOLIC BLOOD PRESSURE: 140 MMHG | TEMPERATURE: 98 F | HEART RATE: 65 BPM

## 2023-02-14 LAB
ALBUMIN SERPL ELPH-MCNC: 4.3 G/DL — SIGNIFICANT CHANGE UP (ref 3.5–5.2)
ALP SERPL-CCNC: 71 U/L — SIGNIFICANT CHANGE UP (ref 30–115)
ALT FLD-CCNC: 13 U/L — SIGNIFICANT CHANGE UP (ref 0–41)
ANION GAP SERPL CALC-SCNC: 12 MMOL/L — SIGNIFICANT CHANGE UP (ref 7–14)
AST SERPL-CCNC: 15 U/L — SIGNIFICANT CHANGE UP (ref 0–41)
BASOPHILS # BLD AUTO: 0.05 K/UL — SIGNIFICANT CHANGE UP (ref 0–0.2)
BASOPHILS NFR BLD AUTO: 0.4 % — SIGNIFICANT CHANGE UP (ref 0–1)
BILIRUB SERPL-MCNC: 0.3 MG/DL — SIGNIFICANT CHANGE UP (ref 0.2–1.2)
BUN SERPL-MCNC: 26 MG/DL — HIGH (ref 10–20)
CALCIUM SERPL-MCNC: 9.3 MG/DL — SIGNIFICANT CHANGE UP (ref 8.4–10.5)
CHLORIDE SERPL-SCNC: 106 MMOL/L — SIGNIFICANT CHANGE UP (ref 98–110)
CO2 SERPL-SCNC: 25 MMOL/L — SIGNIFICANT CHANGE UP (ref 17–32)
CREAT SERPL-MCNC: 0.7 MG/DL — SIGNIFICANT CHANGE UP (ref 0.7–1.5)
EGFR: 103 ML/MIN/1.73M2 — SIGNIFICANT CHANGE UP
EOSINOPHIL # BLD AUTO: 0.11 K/UL — SIGNIFICANT CHANGE UP (ref 0–0.7)
EOSINOPHIL NFR BLD AUTO: 1 % — SIGNIFICANT CHANGE UP (ref 0–8)
GLUCOSE SERPL-MCNC: 82 MG/DL — SIGNIFICANT CHANGE UP (ref 70–99)
HCT VFR BLD CALC: 44.5 % — SIGNIFICANT CHANGE UP (ref 37–47)
HGB BLD-MCNC: 14.2 G/DL — SIGNIFICANT CHANGE UP (ref 12–16)
IMM GRANULOCYTES NFR BLD AUTO: 0.4 % — HIGH (ref 0.1–0.3)
LYMPHOCYTES # BLD AUTO: 39.1 % — SIGNIFICANT CHANGE UP (ref 20.5–51.1)
LYMPHOCYTES # BLD AUTO: 4.46 K/UL — HIGH (ref 1.2–3.4)
MAGNESIUM SERPL-MCNC: 2.1 MG/DL — SIGNIFICANT CHANGE UP (ref 1.8–2.4)
MCHC RBC-ENTMCNC: 30.3 PG — SIGNIFICANT CHANGE UP (ref 27–31)
MCHC RBC-ENTMCNC: 31.9 G/DL — LOW (ref 32–37)
MCV RBC AUTO: 94.9 FL — SIGNIFICANT CHANGE UP (ref 81–99)
MONOCYTES # BLD AUTO: 0.77 K/UL — HIGH (ref 0.1–0.6)
MONOCYTES NFR BLD AUTO: 6.8 % — SIGNIFICANT CHANGE UP (ref 1.7–9.3)
NEUTROPHILS # BLD AUTO: 5.97 K/UL — SIGNIFICANT CHANGE UP (ref 1.4–6.5)
NEUTROPHILS NFR BLD AUTO: 52.3 % — SIGNIFICANT CHANGE UP (ref 42.2–75.2)
NRBC # BLD: 0 /100 WBCS — SIGNIFICANT CHANGE UP (ref 0–0)
PLATELET # BLD AUTO: 240 K/UL — SIGNIFICANT CHANGE UP (ref 130–400)
POTASSIUM SERPL-MCNC: 4.4 MMOL/L — SIGNIFICANT CHANGE UP (ref 3.5–5)
POTASSIUM SERPL-SCNC: 4.4 MMOL/L — SIGNIFICANT CHANGE UP (ref 3.5–5)
PROT SERPL-MCNC: 6.4 G/DL — SIGNIFICANT CHANGE UP (ref 6–8)
RBC # BLD: 4.69 M/UL — SIGNIFICANT CHANGE UP (ref 4.2–5.4)
RBC # FLD: 13.8 % — SIGNIFICANT CHANGE UP (ref 11.5–14.5)
SODIUM SERPL-SCNC: 143 MMOL/L — SIGNIFICANT CHANGE UP (ref 135–146)
WBC # BLD: 11.4 K/UL — HIGH (ref 4.8–10.8)
WBC # FLD AUTO: 11.4 K/UL — HIGH (ref 4.8–10.8)

## 2023-02-14 PROCEDURE — 99238 HOSP IP/OBS DSCHRG MGMT 30/<: CPT

## 2023-02-14 RX ORDER — TIZANIDINE 4 MG/1
1 TABLET ORAL
Qty: 14 | Refills: 0
Start: 2023-02-14 | End: 2023-02-27

## 2023-02-14 RX ADMIN — MORPHINE SULFATE 2 MILLIGRAM(S): 50 CAPSULE, EXTENDED RELEASE ORAL at 10:28

## 2023-02-14 RX ADMIN — MORPHINE SULFATE 2 MILLIGRAM(S): 50 CAPSULE, EXTENDED RELEASE ORAL at 05:22

## 2023-02-14 NOTE — DISCHARGE NOTE PROVIDER - NSDCMRMEDTOKEN_GEN_ALL_CORE_FT
butalbital/acetaminophen/caffeine 50 mg-300 mg-40 mg oral capsule: 1 cap(s) orally every 8 hours, As needed, Headache  clonazePAM 2 mg oral tablet: 2 milligram(s) orally 4 times a day, As Needed  Melatonin Gummies: 2 each orally once a day (at bedtime), As Needed  tiZANidine 4 mg oral tablet: 1 tab(s) orally once a day (at bedtime)

## 2023-02-14 NOTE — DISCHARGE NOTE PROVIDER - NSDCCPCAREPLAN_GEN_ALL_CORE_FT
PRINCIPAL DISCHARGE DIAGNOSIS  Diagnosis: Headache  Assessment and Plan of Treatment: Likely cervicogenic headache vs tension headache  - MRI brain was unremarkable and MRV showed no thrombosis  - As per Neurology recommendations:   - Fioricet PRN and add tizanidine 4 mg qhs  - Recommend outpatient PT for neck pain and warm compress  - May also take ibuprofen for headache over the counter (avoid tylenol as Fioracet also contains Tylenol)      SECONDARY DISCHARGE DIAGNOSES  Diagnosis: COVID-19  Assessment and Plan of Treatment: positive test on 2/13/23    Diagnosis: Abdominal pain  Assessment and Plan of Treatment: - RLQ abdominal pain improved during admission   - CTAP - no evidence of acute/inflammatory process in the abd or pelvis  - continue monitoring for worsening abdominal pain and return to ED if develops worsening pain, nausea/vomiting or diarrhea

## 2023-02-14 NOTE — DISCHARGE NOTE PROVIDER - CARE PROVIDER_API CALL
Kyrie Fraire  [address] [address]  Phone: [phone]  Fax: [phone]  Follow Up Time: 1-3 days

## 2023-02-14 NOTE — DISCHARGE NOTE PROVIDER - NSDCCPGOAL_GEN_ALL_CORE_FT
To get better and follow your care plan as instructed. Vomiting/fever with wheezing x2 days. Sent in by PMD for difficulty breathing. Wheezing B/L. Retractions and abdominal breathing noted. Pt hospitalized for difficulty breathing in the past.

## 2023-02-14 NOTE — DISCHARGE NOTE PROVIDER - ATTENDING DISCHARGE PHYSICAL EXAMINATION:
Patient seen and examined independently in ED today and was hemodynamically stable, A&Ox3, and in NAD/P. She was ambulating without need for assistance.     Plan of care as stated in the discharge document.

## 2023-02-14 NOTE — DISCHARGE NOTE NURSING/CASE MANAGEMENT/SOCIAL WORK - PATIENT PORTAL LINK FT
You can access the FollowMyHealth Patient Portal offered by [facility] by registering at the following website: [email or web address]. By joining [employer]’s FollowMyHealth portal, you will also be able to view your health information using other applications (apps) compatible with our system.

## 2023-02-14 NOTE — DISCHARGE NOTE PROVIDER - ATTENDING DISCHARGE PHYSICAL EXAM:
Attending Discharge Physical Examination: Abdomen soft, nontender, nondistended, bowel sounds present in all 4 quadrants.

## 2023-02-14 NOTE — DISCHARGE NOTE PROVIDER - HOSPITAL COURSE
ADMISSION HPI:   53yo F w/ PMH of anxiety and h/o recurrent UTI (last was 8 months ago) presents for worsening headache and lower abdominal pain. The headaches started when she first tested positive for COVID on 2/2/23. She noted she had symptoms since 1/31/23 but did not test until 2/2/23. She was also tested for flu which was negative. Her RLQ abdominal pain started 5 days ago. She describes her headache as throbbing in the fronto-posterior region that occasionally causes blurriness in her eyes. She said the headache is worsened by loud sounds and when sitting/standing up. There is slight relief when lying down. There is no improvement if she goes to a dark and quiet place. She has also tried several over the counter tylenol and nsaids without any improvement. In the ED, she said only morphine gave her relief of her abdominal pain and only slight relief of the headache, which remains a 10/10 in pain. The patient went to urgent care twice, 2/4/23 and 2/10/23. On the first visit, they gave her prednisone, benzonatate, and fluticasone. On the second visit she was prescribed augmentin and Brompheniramine-dextromethorphan-pseudoephedrine syrup. She said none of these medications helped. She denies fever, chills, nausea, vomiting, chest pain, trouble breathing. She says she does have headache and abdominal pain.     In the ED  Vitals T98.5, /82 (later improved to  not documented without improvement of headhache), HR 80, RR 18, Spo2 97% on RA  Labs were unremarkable. UA negative. COVID +  CTH - no acute intracranial pathology  CTAP - no evidence of acute/inflammatory process in the abd or pelvis    She was given 1L LR, tylenol, dexamethasone, toradol, magnesium, methocarbamol, metoclopramide, and morphine in the ED.    Patient was admitted for headhache (12 Feb 2023 23:15)      HOSPITAL COURSE:     Patient was evaluated by neurology while admitted. MRI/MRV unremarkable. Her pain improved with Fioracet and Tizanidine. Her abdominal pain improved with IV fluids and CT abdomen/pelvis was also unremarkable. At time of discharge, headache had improved, she denies changes in vision, denies any nausea/vomiting/diarrhea, or abdominal pain. She was given instructions to return to the ED if she develops worsening abdominal pain, nausea/vomiting/diarrhea, changes in vision, worsening in headache.     IMAGING:     < from: MR Venogram Head w/wo IV Cont (02.13.23 @ 08:27) >  Unremarkable contrast-enhanced brain MRI and brain MRV.  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 that improved with IV fluids and toradol  - RLQ abdominal pain improved during admission   - CTAP - no evidence of acute/inflammatory process in the abd or pelvis  - continue monitoring for worsening abdominal pain and return to ED if develops worsening pain     # Smoking cessation  - pt smokes 2 cigs for 27 years and expressed she would like to stop  - outpatient tobacco cessation counseling     # COVID-19, tested positive 2/2/23 and 2/12/23  - asymptomatic   - continue to monitor     #  Anxiety disorder  - takes clonazepam 2mg q6h prn at home  - continue home medications   - follow up with prescriber for management

## 2023-02-14 NOTE — DISCHARGE NOTE NURSING/CASE MANAGEMENT/SOCIAL WORK - NSDCPEFALRISK_GEN_ALL_CORE
For information on Fall & Injury Prevention, visit: [email or web address] OR  [email or web address] OR  https://www.cdc.gov/steadi/patient.html

## 2023-02-27 DIAGNOSIS — R10.9 UNSPECIFIED ABDOMINAL PAIN: ICD-10-CM

## 2023-02-27 DIAGNOSIS — F41.9 ANXIETY DISORDER, UNSPECIFIED: ICD-10-CM

## 2023-02-27 DIAGNOSIS — Z87.440 PERSONAL HISTORY OF URINARY (TRACT) INFECTIONS: ICD-10-CM

## 2023-02-27 DIAGNOSIS — U07.1 COVID-19: ICD-10-CM

## 2023-02-27 DIAGNOSIS — F17.210 NICOTINE DEPENDENCE, CIGARETTES, UNCOMPLICATED: ICD-10-CM

## 2023-02-27 DIAGNOSIS — G44.209 TENSION-TYPE HEADACHE, UNSPECIFIED, NOT INTRACTABLE: ICD-10-CM

## 2023-02-27 DIAGNOSIS — Z86.16 PERSONAL HISTORY OF COVID-19: ICD-10-CM

## 2023-02-27 DIAGNOSIS — H53.8 OTHER VISUAL DISTURBANCES: ICD-10-CM

## 2023-02-27 DIAGNOSIS — Z71.6 TOBACCO ABUSE COUNSELING: ICD-10-CM

## 2023-02-27 DIAGNOSIS — H53.149 VISUAL DISCOMFORT, UNSPECIFIED: ICD-10-CM

## 2023-02-27 DIAGNOSIS — G44.86 CERVICOGENIC HEADACHE: ICD-10-CM

## 2023-05-23 ENCOUNTER — APPOINTMENT (OUTPATIENT)
Dept: ORTHOPEDIC SURGERY | Facility: CLINIC | Age: 53
End: 2023-05-23
Payer: COMMERCIAL

## 2023-05-23 DIAGNOSIS — S52.571D OTHER INTRAARTICULAR FRACTURE OF LOWER END OF RIGHT RADIUS, SUBSEQUENT ENCOUNTER FOR CLOSED FRACTURE WITH ROUTINE HEALING: ICD-10-CM

## 2023-05-23 PROCEDURE — 99214 OFFICE O/P EST MOD 30 MIN: CPT

## 2023-05-23 PROCEDURE — 73110 X-RAY EXAM OF WRIST: CPT | Mod: RT

## 2023-05-23 NOTE — ASSESSMENT
[FreeTextEntry1] :   Patient is healing her right-sided wrist fractures but has developed carpal tunnel syndrome.  \par \par The patient was advised of the diagnosis.  The natural history of the pathology was explained in full to the patient in layman's terms. We discussed the nature of the nerve as an electrical cable and what happens to the nerve in carpal tunnel syndrome.  We discussed that treatment for night symptoms included night bracing.  We discussed the possibility of injection when symptoms were intermittent or in patients who were unwilling to undergo surgery with constant symptoms.  We discussed that injection is a diagnostic and therapeutic aide and what this means.  We discussed the use of nerve testing in cases when diagnosis was in doubt or for confirmation to exclude alternate pathology.  We discussed that if symptoms were 24/7 surgery was recommended to give the nerve the best chance to recover but that once symptoms were constant, the nerve may not recover even with surgery.  We discussed that if left alone the nerve progression could worsen and that treatment was indicated to prevent progression of nerve compression.  The longer the nerve is left, the more likely to cause worsening irreversible damage.  All questions were answered.  The risks and benefits of surgical and non-surgical treatment alternatives were explained in full to the patient.\par

## 2023-05-23 NOTE — HISTORY OF PRESENT ILLNESS
[de-identified] : 51-year-old female status post internal fixation of a right wrist fracture.  She is doing well from the fracture but she developed carpal tunnel syndrome the postoperative period and she is bothered significantly by numbness and tingling in the fingers.  There is a bit of numbness is there all the time and is increasing numbness with sleep and activities

## 2023-05-23 NOTE — DATA REVIEWED
[FreeTextEntry1] : X-rays of the right wrist taken in the office today: Acceptable alignment of fracture pattern and routine healing.  Surgical hardware in place and without any lucencies.

## 2023-05-23 NOTE — PHYSICAL EXAM
[de-identified] : R hand: \par Tender volar wrist \par Good finger ROM \par +Tinels \par +Phalens \par +Compression test \par Decreased sensation median nerve distribution\par +thenar atrophy\par

## 2023-06-02 ENCOUNTER — APPOINTMENT (OUTPATIENT)
Dept: ORTHOPEDIC SURGERY | Facility: HOSPITAL | Age: 53
End: 2023-06-02

## 2023-06-02 ENCOUNTER — TRANSCRIPTION ENCOUNTER (OUTPATIENT)
Age: 53
End: 2023-06-02

## 2023-06-02 ENCOUNTER — OUTPATIENT (OUTPATIENT)
Dept: INPATIENT UNIT | Facility: HOSPITAL | Age: 53
LOS: 1 days | Discharge: HOME | End: 2023-06-02
Payer: COMMERCIAL

## 2023-06-02 VITALS
RESPIRATION RATE: 17 BRPM | SYSTOLIC BLOOD PRESSURE: 134 MMHG | TEMPERATURE: 97 F | DIASTOLIC BLOOD PRESSURE: 79 MMHG | WEIGHT: 171.08 LBS | HEART RATE: 61 BPM | OXYGEN SATURATION: 100 % | HEIGHT: 61 IN

## 2023-06-02 VITALS — HEART RATE: 66 BPM | RESPIRATION RATE: 16 BRPM | DIASTOLIC BLOOD PRESSURE: 56 MMHG | SYSTOLIC BLOOD PRESSURE: 119 MMHG

## 2023-06-02 DIAGNOSIS — Z87.81 PERSONAL HISTORY OF (HEALED) TRAUMATIC FRACTURE: Chronic | ICD-10-CM

## 2023-06-02 DIAGNOSIS — G56.01 CARPAL TUNNEL SYNDROME, RIGHT UPPER LIMB: ICD-10-CM

## 2023-06-02 PROCEDURE — 64721 CARPAL TUNNEL SURGERY: CPT | Mod: RT

## 2023-06-02 RX ORDER — OXYCODONE AND ACETAMINOPHEN 5; 325 MG/1; MG/1
1 TABLET ORAL
Qty: 20 | Refills: 0
Start: 2023-06-02 | End: 2023-06-06

## 2023-06-02 RX ORDER — CLONAZEPAM 1 MG
2 TABLET ORAL
Qty: 0 | Refills: 0 | DISCHARGE

## 2023-06-02 RX ORDER — LANOLIN ALCOHOL/MO/W.PET/CERES
2 CREAM (GRAM) TOPICAL
Qty: 0 | Refills: 0 | DISCHARGE

## 2023-06-02 NOTE — ASU PATIENT PROFILE, ADULT - FALL HARM RISK - UNIVERSAL INTERVENTIONS
Bed in lowest position, wheels locked, appropriate side rails in place/Call bell, personal items and telephone in reach/Instruct patient to call for assistance before getting out of bed or chair/Non-slip footwear when patient is out of bed/[place] to call system/Physically safe environment - no spills, clutter or unnecessary equipment/Purposeful Proactive Rounding/Room/bathroom lighting operational, light cord in reach

## 2023-06-02 NOTE — ASU DISCHARGE PLAN (ADULT/PEDIATRIC) - ASU DC SPECIAL INSTRUCTIONSFT

## 2023-06-07 DIAGNOSIS — G56.01 CARPAL TUNNEL SYNDROME, RIGHT UPPER LIMB: ICD-10-CM

## 2023-06-07 DIAGNOSIS — F17.200 NICOTINE DEPENDENCE, UNSPECIFIED, UNCOMPLICATED: ICD-10-CM

## 2023-06-12 ENCOUNTER — APPOINTMENT (OUTPATIENT)
Dept: ORTHOPEDIC SURGERY | Facility: CLINIC | Age: 53
End: 2023-06-12
Payer: COMMERCIAL

## 2023-06-12 DIAGNOSIS — G56.01 CARPAL TUNNEL SYNDROME, RIGHT UPPER LIMB: ICD-10-CM

## 2023-06-12 PROCEDURE — 99024 POSTOP FOLLOW-UP VISIT: CPT

## 2023-06-12 NOTE — PHYSICAL EXAM
[de-identified] : On examination of her right hand she has no swelling, no ecchymosis.  Incision is healing well, sutures are in place.  There is no surrounding erythema or drainage from the wound.  She is able to open and close her fist, she has good range of motion, sensation is intact throughout, 2+ radial pulse.

## 2023-06-12 NOTE — HISTORY OF PRESENT ILLNESS
[de-identified] : 52-year-old female is here today for her first postop appointment status post right open carpal tunnel release 6/2/2023 with Dr. Lou.   She is doing well.  She states the numbness has completely resolved.

## 2023-06-12 NOTE — DISCUSSION/SUMMARY
[de-identified] : At this time the sutures were removed, patient tolerated this well.\par At this point continue working on range of motion and scar massage, we will see her back as needed. \par Patient will call me if any other problems or concerns.  Patient verbalized understanding and agreed with the plan, all questions were answered in the office today.\par

## 2023-07-07 ENCOUNTER — NON-APPOINTMENT (OUTPATIENT)
Age: 53
End: 2023-07-07

## 2023-08-11 ENCOUNTER — APPOINTMENT (OUTPATIENT)
Dept: UROGYNECOLOGY | Facility: CLINIC | Age: 53
End: 2023-08-11
Payer: COMMERCIAL

## 2023-08-11 VITALS
HEIGHT: 61 IN | BODY MASS INDEX: 33.99 KG/M2 | DIASTOLIC BLOOD PRESSURE: 77 MMHG | HEART RATE: 85 BPM | SYSTOLIC BLOOD PRESSURE: 111 MMHG | WEIGHT: 180 LBS

## 2023-08-11 DIAGNOSIS — N95.2 POSTMENOPAUSAL ATROPHIC VAGINITIS: ICD-10-CM

## 2023-08-11 DIAGNOSIS — N94.9 UNSPECIFIED CONDITION ASSOCIATED WITH FEMALE GENITAL ORGANS AND MENSTRUAL CYCLE: ICD-10-CM

## 2023-08-11 PROCEDURE — 99214 OFFICE O/P EST MOD 30 MIN: CPT | Mod: 25

## 2023-08-11 PROCEDURE — 51701 INSERT BLADDER CATHETER: CPT

## 2023-08-11 RX ORDER — OXYCODONE AND ACETAMINOPHEN 5; 325 MG/1; MG/1
5-325 TABLET ORAL
Qty: 20 | Refills: 0 | Status: COMPLETED | COMMUNITY
Start: 2023-06-02 | End: 2023-08-11

## 2023-08-11 RX ORDER — GABAPENTIN 100 MG/1
100 CAPSULE ORAL
Qty: 30 | Refills: 1 | Status: COMPLETED | COMMUNITY
Start: 2022-08-01 | End: 2023-08-11

## 2023-08-11 RX ORDER — OXYCODONE AND ACETAMINOPHEN 5; 325 MG/1; MG/1
5-325 TABLET ORAL
Qty: 15 | Refills: 0 | Status: COMPLETED | COMMUNITY
Start: 2022-10-14 | End: 2023-08-11

## 2023-08-11 RX ORDER — METHYLPREDNISOLONE 4 MG/1
4 TABLET ORAL
Qty: 1 | Refills: 0 | Status: COMPLETED | COMMUNITY
Start: 2022-10-14 | End: 2023-08-11

## 2023-08-11 RX ORDER — OXYCODONE AND ACETAMINOPHEN 5; 325 MG/1; MG/1
5-325 TABLET ORAL 3 TIMES DAILY
Qty: 21 | Refills: 0 | Status: COMPLETED | COMMUNITY
Start: 2022-08-12 | End: 2023-08-11

## 2023-08-11 NOTE — END OF VISIT
With Dr. Forbes to establish PCP   [Time Spent: ___ minutes] : I have spent [unfilled] minutes of time on the encounter. [>50% of the face to face encounter time was spent on counseling and/or coordination of care for ___] : Greater than 50% of the face to face encounter time was spent on counseling and/or coordination of care for [unfilled] Patient/Caregiver provided printed discharge information.

## 2023-08-14 DIAGNOSIS — N30.00 ACUTE CYSTITIS W/OUT HEMATURIA: ICD-10-CM

## 2023-08-14 LAB — URINE CULTURE <10: ABNORMAL

## 2023-08-14 RX ORDER — GABAPENTIN 100 MG/1
100 CAPSULE ORAL
Qty: 60 | Refills: 1 | Status: DISCONTINUED | COMMUNITY
Start: 2023-08-11 | End: 2023-08-14

## 2023-08-14 RX ORDER — ESTRADIOL 0.1 MG/G
0.1 CREAM VAGINAL
Qty: 1 | Refills: 1 | Status: ACTIVE | COMMUNITY
Start: 2023-08-14 | End: 1900-01-01

## 2023-08-16 NOTE — REASON FOR VISIT
[TextEntry] : Reason for visit: Pt has Burning and frequency Voids per day:   5-6 Voids per night:   1 Urge incontinence Yes Stress incontinence: occasionally Constipation: No Fecal incontinence: No  Vaginal bulge: No

## 2023-08-16 NOTE — COUNSELING
[FreeTextEntry1] : If you feel like you have an infection it is important for you to call our office and we will arrange testing of your urine.  Please begin taking Gabapentin 200 mg at bedtime. It can make you sleepy. It takes up to 6 weeks to go into full effect. Please  your refill when you complete the 1st bottle.  Please apply a pea size amount to the inside of the vagina three times a week.   We will contact you if the urine results are abnormal.  Please call my office if you have any issues with the cost or side effects of the medication.   Schedule a 6-8 week follow up med check appointment.

## 2023-08-16 NOTE — PHYSICAL EXAM
[Chaperone Present] : A chaperone was present in the examining room during all aspects of the physical examination [No Acute Distress] : in no acute distress [Well developed] : well developed [Well Nourished] : ~L well nourished [FreeTextEntry1] : Indication: Vaginal burning Urethra was prepped in sterile fashion and then a sterile non- indwelling catheter (14F) was used by me to drain the bladder. The patient tolerated the procedure well. cath: 30 cc

## 2023-08-16 NOTE — DISCUSSION/SUMMARY
[FreeTextEntry1] :  Vaginal atrophy Rx estrace cream to be applied three times per week Precautions reviewed  Vaginal burning Urine culture obtained. Will follow up. Will treat accordingly if necessary  Discussed using azo prn, applying estrogen cream, trying Gabapentin again (100 mg QHS versus 200 mg QHS since she did not see improvement with 100 mg QHS). Alternative options include Elavil (she previously tried), tizanidine, or hydroxyzine if the burning is more urinary related. Additionally, if burning appears to be related to before/after urination versus vaginal, can consider bladder installations.  Patient would like to try Gabapentin 200 mg QHS Precautions reviewed Will return for follow up in 6-8 weeks or earlier if she has any issues

## 2023-08-16 NOTE — HISTORY OF PRESENT ILLNESS
[FreeTextEntry1] : Patient is here for follow up visit for vaginal burning. Last seen on 7/28/2022 for cath specimen.   S/p Uribel for burning, pain: had side effects, diarrhea, rash, did not help. Estrace cream 3x a week  AZO prn  7/25/22: s/p Macrobid empirically treated, culture: contaminated Gabapentin 100 mg QHS   Today, patient states she has been experiencing burning for the past week and a half that is bothersome to her. She cannot specify if it is with urination or just overall vaginal burning. She has been taking azo for the past week and a half. She previously used estrogen cream which was helping but then she ran out. She remembers a pill that she took three times a day that helped (not uribel) but cannot recall the name-possibly pyridium. She notes that she thinks Gabapentin 100 mg did not help, but cannot recall exactly and will check at home if she has the prescription. Thinks it might have made her tired.   She does not know if the burning she is experiencing is a UTI, Denies hematuria, denies fever/chills.

## 2023-10-10 ENCOUNTER — APPOINTMENT (OUTPATIENT)
Dept: UROGYNECOLOGY | Facility: CLINIC | Age: 53
End: 2023-10-10

## 2023-10-10 RX ORDER — SULFAMETHOXAZOLE AND TRIMETHOPRIM 800; 160 MG/1; MG/1
800-160 TABLET ORAL
Qty: 6 | Refills: 0 | Status: COMPLETED | COMMUNITY
Start: 2023-08-14 | End: 2023-10-10

## 2023-11-28 ENCOUNTER — NON-APPOINTMENT (OUTPATIENT)
Age: 53
End: 2023-11-28

## 2023-12-18 ENCOUNTER — NON-APPOINTMENT (OUTPATIENT)
Age: 53
End: 2023-12-18

## 2024-05-24 ENCOUNTER — NON-APPOINTMENT (OUTPATIENT)
Age: 54
End: 2024-05-24

## 2024-06-10 ENCOUNTER — NON-APPOINTMENT (OUTPATIENT)
Age: 54
End: 2024-06-10

## 2024-07-31 NOTE — DISCHARGE NOTE PROVIDER - DISCHARGE DIET
PHYSICAL / OCCUPATIONAL THERAPY - DAILY TREATMENT NOTE     Patient Name: Paige Barahona    Date: 2024    : 1968  Insurance: Payor:  EAST / Plan:  EAST PRIME / Product Type: *No Product type* /      Patient  verified Yes     Visit #   Current / Total 20 35   Time   In / Out 10:35 11:56   Pain   In / Out 3 2   Subjective Functional Status/Changes: Patient states she went back to the gym yesterday for the first time since surgery and rode the stationary bike for 7 miles, \"so I'm really feeling it today.\"   Changes to:  Allergies, Med Hx, Sx Hx?   no       TREATMENT AREA =  Pain in left knee [M25.562]    If an interpreting service is utilized for treatment of this patient, the contents of this document represent the material reviewed with the patient via the .     OBJECTIVE    Modalities Rationale:     decrease edema, decrease inflammation, and decrease pain to improve patient's ability to progress to PLOF and address remaining functional goals.     min [] Estim Unattended, type/location:                                      []  w/ice    []  w/heat    min [] Estim Attended, type/location:                                     []  w/US     []  w/ice    []  w/heat    []  TENS insruct      min []  Mechanical Traction: type/lbs                   []  pro   []  sup   []  int   []  cont    []  before manual    []  after manual    min []  Ultrasound, settings/location:      min []  Iontophoresis w/ dexamethasone, location:                                               []  take home patch       []  in clinic        min  unbilled []  Ice     []  Heat    location/position:     min []  Paraffin,  details:    10 min [x]  Vasopneumatic Device, press/temp: Moderate/34 degs    min []  Whirlpool / Fluido:    If using vaso (only need to measure limb vaso being performed on)      pre-treatment girth : 40.3      post-treatment girth : 39      measured at (landmark location) :  mid-patella    min []  
Regular Diet - No restrictions

## 2024-12-20 NOTE — ED ADULT NURSE NOTE - CCCP TRG CHIEF CMPLNT
January 2, 2025        Kami Woodruff  [address] [facility] [address]       Dear Ms. Woodruff,    Please review the enclosed prep instructions for your procedure with Deion Russell MD.    Procedure(s):  Colonoscopy   Date of Procedure: Friday, January 24, 2025  Time of Procedure: 03:00 PM  Arrival Time: 01:30 PM  Location:    [address]  [id number] [phone]  Please enter through the main doors near the [facility] and check in at Day Surgery Registration.   : Sirisha [phone]    Please read these instructions very carefully at least 10 days prior to your procedure.    Prior to the procedure, the procedure center will be calling to go over your health history and medications to be taken on the day of the procedure.    If there are questions about these instructions or any changes in your health history or medications, please call the office at [phone] at least 7-10 days prior to your procedure.    If you need to cancel or reschedule, please give the office a call within 3 business days prior to the procedure.       Medication Holds      Please contact your prescribing provider with any questions or concerns on medication holds.    Important Information:  You will not be allowed to drive home after the procedure due to the sedation. Please confirm you have an escort to take you home following the procedure. You cannot take a taxi cab, Uber, Lyft, ride share, bus, or other public transport home by yourself. Patients without an appropriate ride home will be cancelled    You are scheduled with Monitored Anesthesia Care (MAC) sedation, upon discharge you will need to have a legal adult (18 years or older) stay with you overnight the day of your procedure or your procedure will be canceled.    Colonoscopy Instructions - SuTab  Pre-Procedure Instructions  Important:  You will need someone to drive you home and remain with you up to  pain, wrist 24-hours after you go home.  Aspirin does not need to be stopped or held prior to procedure.   If you take blood thinners (I.e. Warfarin, Plavix, Xarelto, Eliquis, Aggrenox, Effient, Pradaxa) please call your prescribing provider/Cardiologist at least 14 days before your procedure for instructions on how many days before to hold your blood thinner.  If you are diabetic and take insulin, please call your prescribing/primary care provider for instructions.   The following medications need to be stopped 7 days prior to procedure.    Semaglutide: Wegovy (SubQ Weekly)  Ozempic (SubQ Weekly), Rybelsus (oral tablet daily)  Dulaglutide-Trulicity (SubQ Weekly)  Exenatide: Byetta (SubQ twice daily). Bydureon BCise (SubQ Weekly)  Liraglutide- Saxenda or Victoza (SubQ Daily)  Tirzepatide- Mounjaro (SubQ Weekly)  Combination Products (Contact prescriber for bridge insulin)  Liraglutide and insulin degludec: Xultophy (SubQ daily)  Lixisenatide and insulin galargine: Soliqua (SubQ daily)  If you are taking the diet drug Phentermine, stop taking it 7 days before your procedure.   The preadmission nurse will tell you which medications to take the morning of your procedure with a small sip of water. Do not take any other medications until after your procedure.      10 days prior to your procedure:  Please ensure you've picked up your bowel prep at your pharmacy prior to your procedure in case there is a problem with coverage of the medication by your insurance.    5 days prior to your procedure:  Do not take iron supplements.  If possible, try to avoid non-steroidal anti-inflammatory drugs (Ibuprofen, Advil, Motrin, Aleve, Naproxen etc.).    3 days prior to your procedure:  Do not eat popcorn, seeds, nuts, whole kernel corn and fiber supplements until after your procedure.    2 days prior to your procedure:  Confirm driving arrangements - please have a responsible adult accompany you throughout the procedure and for 24-hours after  your procedure.  No solid foods after midnight.    1 day prior to your procedure:  No solid food.  No alcohol.  Clear liquids ALL DAY. If you can see through it, you can drink it. Examples are clear broth or consommé, fruit juices without pulp (no orange or tomato), sports drinks (Gatorade, Propel etc.), Jell-O (no fruit added), coffee or tea (sweeteners are ok, no milk or cream), soda or non-alcoholic carbonated drinks, popsicles, water, and clear hard candies. Avoid red and purple colors.   It is important to try and stay hydrated during the day by drinking fluids. A solution such as Gatorade, or a similar sports drink, will help you to stay hydrated.  At 5:00 PM, take 12 tablets with 16 ounces of water. One hour after taking the last tablet take 16 more ounces for fluids. Wait another 30 minutes and take 16 more ounces of water for a total of 48 ounces.  You can continue to be on the clear liquid diet while prepping.      Day of your procedure:  No solid food. No alcohol.  6 hours prior to leaving your house take 12 tablets with 16 ounces of water. One hour after taking the last tablet take 16 more ounces for fluids. Wait another 30 minutes and take 16 more ounces of water for a total of 48 ounces.    You can take your medications as instructed during phone call with procedure center staff, with a sip of water up to 4 hours prior to your scheduled procedure.   You should not drink or take anything by mouth 2 hours prior to your procedure.  Your stools should have a clear to see-through cloudy yellow appearance with no formed substance. If your stools are darker or have formed substance, please call the office and speak with a nurse who will get further instructions from your physician.                                INSURANCE COVERAGE REGARDING PAYMENT  FOR YOUR COLONOSCOPY    Colon Cancer is the second leading cause of death among cancers, per the American [employer] Society. It is preventable. Early detection is  the key. Your doctor will determine which tests need to be done for prevention and/or treatment. However, colonoscopies can be performed for several reasons:  Screening To screen for any problems within the colon. In this case, the patient is not symptomatic and does not have a personal history of previous colon cancer/condition or abnormal findings. Billed as screening.   Surveillance To monitor for a previously diagnosed colon condition (such as polyps) or when performed at more frequent intervals than every ten years because the patient has a personal/family history of colonic polyps or colon cancer. Billed as diagnostic.   Diagnostic Patient with symptoms, used to evaluate the colon. Billed as diagnostic.     If during a screening colonoscopy, our physician finds an abnormality, performs a biopsy or polypectomy (removal of polyp), your insurance company may consider the procedure to be a diagnostic exam and no longer a screening procedure.     Every insurance company is different. We encourage you to call your insurance company regarding plan benefits. Generally, screening benefits and diagnostic benefits may be paid at different levels. Charges associated with anesthesia or type of facility may also be processed differently. This varies with each insurance company, so we want you to be aware of this prior to your procedure. You do not have to call your insurance company if you have Medicare. For an estimate of potential charges, you may call the [facility] Patient Contact Center at [phone].      The authorization staff at [facility] will contact your insurance company to check precertification requirements for your colonoscopy. However, precertification, which serves as notification is never a guarantee of payment. If you have questions regarding precertification for your procedure, please contact your insurance company.

## 2025-02-06 ENCOUNTER — APPOINTMENT (OUTPATIENT)
Dept: OBGYN | Facility: CLINIC | Age: 55
End: 2025-02-06
Payer: COMMERCIAL

## 2025-02-06 ENCOUNTER — NON-APPOINTMENT (OUTPATIENT)
Age: 55
End: 2025-02-06

## 2025-02-06 ENCOUNTER — LABORATORY RESULT (OUTPATIENT)
Age: 55
End: 2025-02-06

## 2025-02-06 VITALS
SYSTOLIC BLOOD PRESSURE: 100 MMHG | HEART RATE: 92 BPM | HEIGHT: 61 IN | WEIGHT: 170 LBS | DIASTOLIC BLOOD PRESSURE: 64 MMHG | OXYGEN SATURATION: 98 % | TEMPERATURE: 98.2 F | BODY MASS INDEX: 32.1 KG/M2

## 2025-02-06 DIAGNOSIS — Z11.3 ENCOUNTER FOR SCREENING FOR INFECTIONS WITH A PREDOMINANTLY SEXUAL MODE OF TRANSMISSION: ICD-10-CM

## 2025-02-06 DIAGNOSIS — R21 RASH AND OTHER NONSPECIFIC SKIN ERUPTION: ICD-10-CM

## 2025-02-06 DIAGNOSIS — N39.0 URINARY TRACT INFECTION, SITE NOT SPECIFIED: ICD-10-CM

## 2025-02-06 DIAGNOSIS — R31.9 URINARY TRACT INFECTION, SITE NOT SPECIFIED: ICD-10-CM

## 2025-02-06 DIAGNOSIS — R31.9 HEMATURIA, UNSPECIFIED: ICD-10-CM

## 2025-02-06 DIAGNOSIS — Z01.419 ENCOUNTER FOR GYNECOLOGICAL EXAMINATION (GENERAL) (ROUTINE) W/OUT ABNORMAL FINDINGS: ICD-10-CM

## 2025-02-06 DIAGNOSIS — Z11.51 ENCOUNTER FOR SCREENING FOR HUMAN PAPILLOMAVIRUS (HPV): ICD-10-CM

## 2025-02-06 LAB
BILIRUB UR QL STRIP: NORMAL
CLARITY UR: NORMAL
COLLECTION METHOD: NORMAL
GLUCOSE UR-MCNC: NORMAL
HCG UR QL: 2 EU/DL
HGB UR QL STRIP.AUTO: NORMAL
KETONES UR-MCNC: NORMAL
LEUKOCYTE ESTERASE UR QL STRIP: NORMAL
NITRITE UR QL STRIP: POSITIVE
PH UR STRIP: 5
PROT UR STRIP-MCNC: NORMAL
SP GR UR STRIP: <=1.005

## 2025-02-06 PROCEDURE — 81003 URINALYSIS AUTO W/O SCOPE: CPT | Mod: QW

## 2025-02-06 PROCEDURE — 99386 PREV VISIT NEW AGE 40-64: CPT

## 2025-02-06 RX ORDER — CLOTRIMAZOLE AND BETAMETHASONE DIPROPIONATE 10; .5 MG/ML; MG/ML
1-0.05 LOTION TOPICAL TWICE DAILY
Qty: 1 | Refills: 0 | Status: ACTIVE | COMMUNITY
Start: 2025-02-06 | End: 1900-01-01

## 2025-02-06 RX ORDER — CIPROFLOXACIN HYDROCHLORIDE 500 MG/1
500 TABLET, FILM COATED ORAL TWICE DAILY
Qty: 14 | Refills: 0 | Status: ACTIVE | COMMUNITY
Start: 2025-02-06 | End: 1900-01-01

## 2025-02-06 RX ORDER — PHENAZOPYRIDINE 100 MG/1
100 TABLET, FILM COATED ORAL 3 TIMES DAILY
Qty: 15 | Refills: 0 | Status: ACTIVE | COMMUNITY
Start: 2025-02-06 | End: 1900-01-01

## 2025-02-07 LAB
APPEARANCE: ABNORMAL
BILIRUBIN URINE: ABNORMAL
BLOOD URINE: ABNORMAL
COLOR: ABNORMAL
GLUCOSE QUALITATIVE U: NEGATIVE MG/DL
KETONES URINE: NEGATIVE MG/DL
LEUKOCYTE ESTERASE URINE: ABNORMAL
NITRITE URINE: POSITIVE
PH URINE: 6.5
PROTEIN URINE: 100 MG/DL
SPECIFIC GRAVITY URINE: 1.02
UROBILINOGEN URINE: 1 MG/DL

## 2025-02-09 LAB — BACTERIA UR CULT: ABNORMAL

## 2025-02-10 LAB — HPV HIGH+LOW RISK DNA PNL CVX: NOT DETECTED

## 2025-07-10 NOTE — ED ADULT TRIAGE NOTE - INTERNATIONAL TRAVEL
Copied from CRM #[id number]. Topic: Appointments - Appointment Access  >> Jul 10, 2025  9:21 AM Elizabeth wrote:  Patient is calling to schedule an appointment no dates in epic. Please contact pt via portal  
LVM no coverage on file.   
No